# Patient Record
Sex: MALE | Race: WHITE | Employment: FULL TIME | URBAN - METROPOLITAN AREA
[De-identification: names, ages, dates, MRNs, and addresses within clinical notes are randomized per-mention and may not be internally consistent; named-entity substitution may affect disease eponyms.]

---

## 2017-01-26 DIAGNOSIS — Z12.11 SPECIAL SCREENING FOR MALIGNANT NEOPLASMS, COLON: ICD-10-CM

## 2017-01-26 LAB
HEMOCCULT STL QL: NORMAL

## 2017-01-26 PROCEDURE — 82270 OCCULT BLOOD FECES: CPT | Performed by: INTERNAL MEDICINE

## 2017-05-22 RX ORDER — LISINOPRIL 20 MG/1
TABLET ORAL
Qty: 180 TABLET | Refills: 3 | Status: SHIPPED | OUTPATIENT
Start: 2017-05-22

## 2017-06-22 RX ORDER — ATORVASTATIN CALCIUM 40 MG/1
TABLET, FILM COATED ORAL
Qty: 90 TABLET | Refills: 0 | Status: SHIPPED | OUTPATIENT
Start: 2017-06-22 | End: 2017-09-18 | Stop reason: SDUPTHER

## 2017-06-26 ENCOUNTER — OFFICE VISIT (OUTPATIENT)
Dept: CARDIOLOGY CLINIC | Age: 72
End: 2017-06-26

## 2017-06-26 VITALS
HEART RATE: 60 BPM | BODY MASS INDEX: 30.21 KG/M2 | DIASTOLIC BLOOD PRESSURE: 84 MMHG | SYSTOLIC BLOOD PRESSURE: 136 MMHG | WEIGHT: 229 LBS

## 2017-06-26 DIAGNOSIS — I25.10 ASHD (ARTERIOSCLEROTIC HEART DISEASE): Primary | ICD-10-CM

## 2017-06-26 DIAGNOSIS — E78.00 PURE HYPERCHOLESTEROLEMIA: ICD-10-CM

## 2017-06-26 DIAGNOSIS — I10 ESSENTIAL HYPERTENSION: ICD-10-CM

## 2017-06-26 PROCEDURE — 99213 OFFICE O/P EST LOW 20 MIN: CPT | Performed by: INTERNAL MEDICINE

## 2017-06-26 PROCEDURE — G8598 ASA/ANTIPLAT THER USED: HCPCS | Performed by: INTERNAL MEDICINE

## 2017-06-26 PROCEDURE — 4040F PNEUMOC VAC/ADMIN/RCVD: CPT | Performed by: INTERNAL MEDICINE

## 2017-06-26 PROCEDURE — G8417 CALC BMI ABV UP PARAM F/U: HCPCS | Performed by: INTERNAL MEDICINE

## 2017-06-26 PROCEDURE — 3017F COLORECTAL CA SCREEN DOC REV: CPT | Performed by: INTERNAL MEDICINE

## 2017-06-26 PROCEDURE — 1036F TOBACCO NON-USER: CPT | Performed by: INTERNAL MEDICINE

## 2017-06-26 PROCEDURE — G8427 DOCREV CUR MEDS BY ELIG CLIN: HCPCS | Performed by: INTERNAL MEDICINE

## 2017-06-26 PROCEDURE — 1123F ACP DISCUSS/DSCN MKR DOCD: CPT | Performed by: INTERNAL MEDICINE

## 2017-06-26 RX ORDER — ST. JOHN'S WORT 300 MG
CAPSULE ORAL
Status: ON HOLD | COMMUNITY
End: 2018-06-29 | Stop reason: CLARIF

## 2017-06-28 ENCOUNTER — TELEPHONE (OUTPATIENT)
Dept: INTERNAL MEDICINE | Age: 72
End: 2017-06-28

## 2017-09-01 RX ORDER — METOPROLOL TARTRATE 50 MG/1
TABLET, FILM COATED ORAL
Qty: 180 TABLET | Refills: 0 | Status: SHIPPED | OUTPATIENT
Start: 2017-09-01 | End: 2017-11-25 | Stop reason: SDUPTHER

## 2017-09-19 RX ORDER — ATORVASTATIN CALCIUM 40 MG/1
TABLET, FILM COATED ORAL
Qty: 90 TABLET | Refills: 2 | Status: SHIPPED | OUTPATIENT
Start: 2017-09-19

## 2017-11-27 NOTE — TELEPHONE ENCOUNTER
Requested Prescriptions     Pending Prescriptions Disp Refills    metoprolol tartrate (LOPRESSOR) 50 MG tablet [Pharmacy Med Name: METOPROLOL TARTRATE 50MG TABLETS] 180 tablet 3     Sig: TAKE 1 TABLET BY MOUTH TWICE DAILY     Last Fill         Last seen        Next appointment      9/1/17 6/26/17 1/16/18      Last labs     Ordering Physician       6/6/17  Patrick Araujo

## 2017-11-28 RX ORDER — METOPROLOL TARTRATE 50 MG/1
TABLET, FILM COATED ORAL
Qty: 180 TABLET | Refills: 3 | Status: ON HOLD | OUTPATIENT
Start: 2017-11-28 | End: 2020-09-24

## 2017-12-06 LAB — PROSTATE SPECIFIC ANTIGEN: 8.51 NG/ML

## 2018-01-16 ENCOUNTER — TELEPHONE (OUTPATIENT)
Dept: INTERNAL MEDICINE | Age: 73
End: 2018-01-16

## 2018-01-16 DIAGNOSIS — I10 ESSENTIAL HYPERTENSION: ICD-10-CM

## 2018-01-16 DIAGNOSIS — E83.52 HYPERCALCEMIA: ICD-10-CM

## 2018-01-16 DIAGNOSIS — E78.00 PURE HYPERCHOLESTEROLEMIA: Primary | ICD-10-CM

## 2018-01-16 DIAGNOSIS — I25.10 ASHD (ARTERIOSCLEROTIC HEART DISEASE): ICD-10-CM

## 2018-01-16 DIAGNOSIS — E21.0 PRIMARY HYPERPARATHYROIDISM (HCC): ICD-10-CM

## 2018-01-27 LAB
ALBUMIN SERPL-MCNC: 4.3 G/DL
ALP BLD-CCNC: 70 U/L
ALT SERPL-CCNC: 23 U/L
ANION GAP SERPL CALCULATED.3IONS-SCNC: NORMAL MMOL/L
AST SERPL-CCNC: 23 U/L
BASOPHILS ABSOLUTE: 0 /ΜL
BASOPHILS RELATIVE PERCENT: 0 %
BILIRUB SERPL-MCNC: 0.4 MG/DL (ref 0.1–1.4)
BUN BLDV-MCNC: 17 MG/DL
CALCIUM SERPL-MCNC: 9.5 MG/DL
CHLORIDE BLD-SCNC: 102 MMOL/L
CHOLESTEROL, TOTAL: 196 MG/DL
CHOLESTEROL/HDL RATIO: NORMAL
CO2: 24 MMOL/L
CREAT SERPL-MCNC: 0.87 MG/DL
EOSINOPHILS ABSOLUTE: 0.4 /ΜL
EOSINOPHILS RELATIVE PERCENT: 6 %
GFR CALCULATED: NORMAL
GLUCOSE BLD-MCNC: 108 MG/DL
HCT VFR BLD CALC: 42.5 % (ref 41–53)
HDLC SERPL-MCNC: 70 MG/DL (ref 35–70)
HEMOGLOBIN: 14.1 G/DL (ref 13.5–17.5)
LDL CHOLESTEROL CALCULATED: 111 MG/DL (ref 0–160)
LYMPHOCYTES ABSOLUTE: 1.5 /ΜL
LYMPHOCYTES RELATIVE PERCENT: 23 %
MCH RBC QN AUTO: 31.6 PG
MCHC RBC AUTO-ENTMCNC: 33.2 G/DL
MCV RBC AUTO: 95 FL
MONOCYTES ABSOLUTE: 0.7 /ΜL
MONOCYTES RELATIVE PERCENT: 11 %
NEUTROPHILS ABSOLUTE: 4.1 /ΜL
NEUTROPHILS RELATIVE PERCENT: 60 %
PDW BLD-RTO: 12.4 %
PLATELET # BLD: 258 K/ΜL
PMV BLD AUTO: NORMAL FL
POTASSIUM SERPL-SCNC: 4.6 MMOL/L
RBC # BLD: 4.46 10^6/ΜL
SODIUM BLD-SCNC: 142 MMOL/L
TOTAL CK: 223 U/L
TOTAL PROTEIN: 7.1
TRIGL SERPL-MCNC: 74 MG/DL
TSH SERPL DL<=0.05 MIU/L-ACNC: 1.24 UIU/ML
VLDLC SERPL CALC-MCNC: 15 MG/DL
WBC # BLD: 6.8 10^3/ML

## 2018-01-29 DIAGNOSIS — I25.10 ASHD (ARTERIOSCLEROTIC HEART DISEASE): ICD-10-CM

## 2018-01-29 DIAGNOSIS — E83.52 HYPERCALCEMIA: ICD-10-CM

## 2018-01-29 DIAGNOSIS — E78.00 PURE HYPERCHOLESTEROLEMIA: ICD-10-CM

## 2018-01-29 DIAGNOSIS — I10 ESSENTIAL HYPERTENSION: ICD-10-CM

## 2018-04-19 ENCOUNTER — OFFICE VISIT (OUTPATIENT)
Dept: INTERNAL MEDICINE | Age: 73
End: 2018-04-19

## 2018-04-19 VITALS
WEIGHT: 223 LBS | HEIGHT: 72 IN | HEART RATE: 58 BPM | BODY MASS INDEX: 30.2 KG/M2 | SYSTOLIC BLOOD PRESSURE: 120 MMHG | DIASTOLIC BLOOD PRESSURE: 70 MMHG | RESPIRATION RATE: 12 BRPM

## 2018-04-19 DIAGNOSIS — Z13.6 SCREENING FOR AAA (ABDOMINAL AORTIC ANEURYSM): ICD-10-CM

## 2018-04-19 DIAGNOSIS — I25.10 ASHD (ARTERIOSCLEROTIC HEART DISEASE): ICD-10-CM

## 2018-04-19 DIAGNOSIS — Z12.11 SPECIAL SCREENING FOR MALIGNANT NEOPLASMS, COLON: ICD-10-CM

## 2018-04-19 DIAGNOSIS — E78.00 PURE HYPERCHOLESTEROLEMIA: ICD-10-CM

## 2018-04-19 DIAGNOSIS — Z00.00 MEDICARE ANNUAL WELLNESS VISIT, SUBSEQUENT: Primary | ICD-10-CM

## 2018-04-19 DIAGNOSIS — I10 ESSENTIAL HYPERTENSION: ICD-10-CM

## 2018-04-19 LAB
BILIRUBIN, POC: NORMAL
BLOOD URINE, POC: NORMAL
CLARITY, POC: CLEAR
COLOR, POC: YELLOW
GLUCOSE URINE, POC: NORMAL
KETONES, POC: NORMAL
LEUKOCYTE EST, POC: NORMAL
NITRITE, POC: NORMAL
PH, POC: 5
PROTEIN, POC: NORMAL
SPECIFIC GRAVITY, POC: 1.01
UROBILINOGEN, POC: NORMAL

## 2018-04-19 PROCEDURE — 93000 ELECTROCARDIOGRAM COMPLETE: CPT | Performed by: INTERNAL MEDICINE

## 2018-04-19 PROCEDURE — 81002 URINALYSIS NONAUTO W/O SCOPE: CPT | Performed by: INTERNAL MEDICINE

## 2018-04-19 PROCEDURE — G0444 DEPRESSION SCREEN ANNUAL: HCPCS | Performed by: INTERNAL MEDICINE

## 2018-04-19 PROCEDURE — G0439 PPPS, SUBSEQ VISIT: HCPCS | Performed by: INTERNAL MEDICINE

## 2018-04-19 PROCEDURE — 4040F PNEUMOC VAC/ADMIN/RCVD: CPT | Performed by: INTERNAL MEDICINE

## 2018-04-19 PROCEDURE — G8598 ASA/ANTIPLAT THER USED: HCPCS | Performed by: INTERNAL MEDICINE

## 2018-04-19 RX ORDER — TAMSULOSIN HYDROCHLORIDE 0.4 MG/1
0.4 CAPSULE ORAL 2 TIMES DAILY
Status: ON HOLD | COMMUNITY
End: 2018-07-01 | Stop reason: HOSPADM

## 2018-04-19 ASSESSMENT — LIFESTYLE VARIABLES
HOW OFTEN DURING THE LAST YEAR HAVE YOU FOUND THAT YOU WERE NOT ABLE TO STOP DRINKING ONCE YOU HAD STARTED: 0
HOW OFTEN DURING THE LAST YEAR HAVE YOU NEEDED AN ALCOHOLIC DRINK FIRST THING IN THE MORNING TO GET YOURSELF GOING AFTER A NIGHT OF HEAVY DRINKING: 0
HOW OFTEN DO YOU HAVE A DRINK CONTAINING ALCOHOL: 4
HOW OFTEN DO YOU HAVE SIX OR MORE DRINKS ON ONE OCCASION: 1
HAVE YOU OR SOMEONE ELSE BEEN INJURED AS A RESULT OF YOUR DRINKING: 0
HOW OFTEN DURING THE LAST YEAR HAVE YOU HAD A FEELING OF GUILT OR REMORSE AFTER DRINKING: 0
AUDIT-C TOTAL SCORE: 5
HOW MANY STANDARD DRINKS CONTAINING ALCOHOL DO YOU HAVE ON A TYPICAL DAY: 0
HAS A RELATIVE, FRIEND, DOCTOR, OR ANOTHER HEALTH PROFESSIONAL EXPRESSED CONCERN ABOUT YOUR DRINKING OR SUGGESTED YOU CUT DOWN: 0
HOW OFTEN DURING THE LAST YEAR HAVE YOU FAILED TO DO WHAT WAS NORMALLY EXPECTED FROM YOU BECAUSE OF DRINKING: 0
AUDIT TOTAL SCORE: 5
HOW OFTEN DURING THE LAST YEAR HAVE YOU BEEN UNABLE TO REMEMBER WHAT HAPPENED THE NIGHT BEFORE BECAUSE YOU HAD BEEN DRINKING: 0

## 2018-04-19 ASSESSMENT — ANXIETY QUESTIONNAIRES: GAD7 TOTAL SCORE: 2

## 2018-04-25 ENCOUNTER — HOSPITAL ENCOUNTER (OUTPATIENT)
Dept: ULTRASOUND IMAGING | Age: 73
Discharge: HOME | End: 2018-04-25
Attending: INTERNAL MEDICINE | Admitting: INTERNAL MEDICINE

## 2018-04-25 DIAGNOSIS — Z13.6 ENCOUNTER FOR SCREENING FOR CARDIOVASCULAR DISORDERS: ICD-10-CM

## 2018-04-25 DIAGNOSIS — Z13.6 SCREENING FOR AAA (ABDOMINAL AORTIC ANEURYSM): ICD-10-CM

## 2018-05-09 DIAGNOSIS — Z12.11 SPECIAL SCREENING FOR MALIGNANT NEOPLASMS, COLON: ICD-10-CM

## 2018-05-09 LAB
CONTROL: NORMAL
HEMOCCULT STL QL: NORMAL

## 2018-05-09 PROCEDURE — G0328 FECAL BLOOD SCRN IMMUNOASSAY: HCPCS | Performed by: INTERNAL MEDICINE

## 2018-06-04 LAB — PROSTATE SPECIFIC ANTIGEN: 8.24 NG/ML

## 2018-06-25 ENCOUNTER — OFFICE VISIT (OUTPATIENT)
Dept: INTERNAL MEDICINE | Age: 73
End: 2018-06-25

## 2018-06-25 VITALS
RESPIRATION RATE: 12 BRPM | WEIGHT: 222 LBS | SYSTOLIC BLOOD PRESSURE: 130 MMHG | DIASTOLIC BLOOD PRESSURE: 76 MMHG | BODY MASS INDEX: 30.07 KG/M2 | HEART RATE: 50 BPM | HEIGHT: 72 IN

## 2018-06-25 DIAGNOSIS — Z01.818 PRE-OP EXAM: ICD-10-CM

## 2018-06-25 DIAGNOSIS — N40.1 BENIGN PROSTATIC HYPERPLASIA WITH URINARY RETENTION: ICD-10-CM

## 2018-06-25 DIAGNOSIS — R33.8 BENIGN PROSTATIC HYPERPLASIA WITH URINARY RETENTION: ICD-10-CM

## 2018-06-25 DIAGNOSIS — Z01.818 PRE-OP EXAM: Primary | ICD-10-CM

## 2018-06-25 LAB
ANION GAP SERPL CALCULATED.3IONS-SCNC: 11 MMOL/L (ref 3–16)
BASOPHILS ABSOLUTE: 0 K/UL (ref 0–0.2)
BASOPHILS RELATIVE PERCENT: 0.5 %
BUN BLDV-MCNC: 21 MG/DL (ref 7–20)
CALCIUM SERPL-MCNC: 9.5 MG/DL (ref 8.3–10.6)
CHLORIDE BLD-SCNC: 103 MMOL/L (ref 99–110)
CO2: 29 MMOL/L (ref 21–32)
CREAT SERPL-MCNC: 0.7 MG/DL (ref 0.8–1.3)
EOSINOPHILS ABSOLUTE: 0.3 K/UL (ref 0–0.6)
EOSINOPHILS RELATIVE PERCENT: 3.7 %
GFR AFRICAN AMERICAN: >60
GFR NON-AFRICAN AMERICAN: >60
GLUCOSE BLD-MCNC: 101 MG/DL (ref 70–99)
HCT VFR BLD CALC: 40.8 % (ref 40.5–52.5)
HEMOGLOBIN: 14.1 G/DL (ref 13.5–17.5)
LYMPHOCYTES ABSOLUTE: 1.8 K/UL (ref 1–5.1)
LYMPHOCYTES RELATIVE PERCENT: 21.1 %
MCH RBC QN AUTO: 33.1 PG (ref 26–34)
MCHC RBC AUTO-ENTMCNC: 34.5 G/DL (ref 31–36)
MCV RBC AUTO: 96 FL (ref 80–100)
MONOCYTES ABSOLUTE: 0.7 K/UL (ref 0–1.3)
MONOCYTES RELATIVE PERCENT: 8.4 %
NEUTROPHILS ABSOLUTE: 5.6 K/UL (ref 1.7–7.7)
NEUTROPHILS RELATIVE PERCENT: 66.3 %
PDW BLD-RTO: 13.3 % (ref 12.4–15.4)
PLATELET # BLD: 254 K/UL (ref 135–450)
PMV BLD AUTO: 8.4 FL (ref 5–10.5)
POTASSIUM SERPL-SCNC: 4.6 MMOL/L (ref 3.5–5.1)
RBC # BLD: 4.25 M/UL (ref 4.2–5.9)
SODIUM BLD-SCNC: 143 MMOL/L (ref 136–145)
WBC # BLD: 8.4 K/UL (ref 4–11)

## 2018-06-25 PROCEDURE — G8417 CALC BMI ABV UP PARAM F/U: HCPCS | Performed by: INTERNAL MEDICINE

## 2018-06-25 PROCEDURE — 93000 ELECTROCARDIOGRAM COMPLETE: CPT | Performed by: INTERNAL MEDICINE

## 2018-06-25 PROCEDURE — 99213 OFFICE O/P EST LOW 20 MIN: CPT | Performed by: INTERNAL MEDICINE

## 2018-06-25 PROCEDURE — G8427 DOCREV CUR MEDS BY ELIG CLIN: HCPCS | Performed by: INTERNAL MEDICINE

## 2018-06-25 PROCEDURE — 3017F COLORECTAL CA SCREEN DOC REV: CPT | Performed by: INTERNAL MEDICINE

## 2018-06-29 PROBLEM — N13.8 BPH WITH OBSTRUCTION/LOWER URINARY TRACT SYMPTOMS: Status: ACTIVE | Noted: 2018-06-29

## 2018-06-29 PROBLEM — N40.1 BPH WITH OBSTRUCTION/LOWER URINARY TRACT SYMPTOMS: Status: ACTIVE | Noted: 2018-06-29

## 2018-06-30 PROBLEM — Z90.79 S/P PROSTATECTOMY: Status: ACTIVE | Noted: 2018-06-30

## 2018-11-21 LAB — PROSTATE SPECIFIC ANTIGEN: 5.75 NG/ML

## 2019-09-13 ENCOUNTER — TELEPHONE (OUTPATIENT)
Dept: INTERNAL MEDICINE CLINIC | Age: 74
End: 2019-09-13

## 2019-09-24 ENCOUNTER — TELEPHONE (OUTPATIENT)
Dept: INTERNAL MEDICINE CLINIC | Age: 74
End: 2019-09-24

## 2019-09-25 ENCOUNTER — OFFICE VISIT (OUTPATIENT)
Dept: ENT CLINIC | Age: 74
End: 2019-09-25
Payer: MEDICARE

## 2019-09-25 VITALS
WEIGHT: 229.6 LBS | TEMPERATURE: 98.3 F | SYSTOLIC BLOOD PRESSURE: 139 MMHG | BODY MASS INDEX: 30.43 KG/M2 | HEIGHT: 73 IN | DIASTOLIC BLOOD PRESSURE: 67 MMHG | HEART RATE: 67 BPM

## 2019-09-25 DIAGNOSIS — R42 LIGHT HEADED: Primary | ICD-10-CM

## 2019-09-25 PROCEDURE — 99203 OFFICE O/P NEW LOW 30 MIN: CPT | Performed by: OTOLARYNGOLOGY

## 2019-09-25 PROCEDURE — G8417 CALC BMI ABV UP PARAM F/U: HCPCS | Performed by: OTOLARYNGOLOGY

## 2019-09-25 PROCEDURE — G8427 DOCREV CUR MEDS BY ELIG CLIN: HCPCS | Performed by: OTOLARYNGOLOGY

## 2019-09-25 PROCEDURE — 4040F PNEUMOC VAC/ADMIN/RCVD: CPT | Performed by: OTOLARYNGOLOGY

## 2019-09-25 PROCEDURE — 1123F ACP DISCUSS/DSCN MKR DOCD: CPT | Performed by: OTOLARYNGOLOGY

## 2019-09-25 PROCEDURE — 1036F TOBACCO NON-USER: CPT | Performed by: OTOLARYNGOLOGY

## 2019-09-25 PROCEDURE — 3017F COLORECTAL CA SCREEN DOC REV: CPT | Performed by: OTOLARYNGOLOGY

## 2019-09-25 PROCEDURE — G8598 ASA/ANTIPLAT THER USED: HCPCS | Performed by: OTOLARYNGOLOGY

## 2019-09-27 ENCOUNTER — TELEPHONE (OUTPATIENT)
Dept: INTERNAL MEDICINE CLINIC | Age: 74
End: 2019-09-27

## 2019-10-10 PROBLEM — Z90.79 S/P PROSTATECTOMY: Status: RESOLVED | Noted: 2018-06-30 | Resolved: 2019-10-10

## 2019-10-10 PROBLEM — N13.8 BPH WITH OBSTRUCTION/LOWER URINARY TRACT SYMPTOMS: Status: RESOLVED | Noted: 2018-06-29 | Resolved: 2019-10-10

## 2019-10-10 PROBLEM — N40.1 BPH WITH OBSTRUCTION/LOWER URINARY TRACT SYMPTOMS: Status: RESOLVED | Noted: 2018-06-29 | Resolved: 2019-10-10

## 2019-10-15 ENCOUNTER — OFFICE VISIT (OUTPATIENT)
Dept: INTERNAL MEDICINE CLINIC | Age: 74
End: 2019-10-15
Payer: MEDICARE

## 2019-10-15 VITALS
HEART RATE: 60 BPM | SYSTOLIC BLOOD PRESSURE: 124 MMHG | DIASTOLIC BLOOD PRESSURE: 80 MMHG | BODY MASS INDEX: 29.82 KG/M2 | HEIGHT: 73 IN | WEIGHT: 225 LBS | RESPIRATION RATE: 12 BRPM

## 2019-10-15 DIAGNOSIS — Z13.29 SCREENING FOR THYROID DISORDER: ICD-10-CM

## 2019-10-15 DIAGNOSIS — I10 ESSENTIAL HYPERTENSION: ICD-10-CM

## 2019-10-15 DIAGNOSIS — R42 DIZZY: ICD-10-CM

## 2019-10-15 DIAGNOSIS — Z00.00 MEDICARE ANNUAL WELLNESS VISIT, SUBSEQUENT: Primary | ICD-10-CM

## 2019-10-15 DIAGNOSIS — I25.10 ASHD (ARTERIOSCLEROTIC HEART DISEASE): ICD-10-CM

## 2019-10-15 DIAGNOSIS — E78.5 HYPERLIPIDEMIA, UNSPECIFIED HYPERLIPIDEMIA TYPE: ICD-10-CM

## 2019-10-15 DIAGNOSIS — Z12.11 SPECIAL SCREENING FOR MALIGNANT NEOPLASMS, COLON: ICD-10-CM

## 2019-10-15 DIAGNOSIS — Z12.5 SPECIAL SCREENING FOR MALIGNANT NEOPLASM OF PROSTATE: ICD-10-CM

## 2019-10-15 LAB
BILIRUBIN, POC: NORMAL
BLOOD URINE, POC: NORMAL
CLARITY, POC: CLEAR
COLOR, POC: YELLOW
GLUCOSE URINE, POC: NORMAL
KETONES, POC: NORMAL
LEUKOCYTE EST, POC: NORMAL
NITRITE, POC: NORMAL
PH, POC: 6
PROTEIN, POC: NORMAL
SPECIFIC GRAVITY, POC: 1
UROBILINOGEN, POC: NORMAL

## 2019-10-15 PROCEDURE — 3017F COLORECTAL CA SCREEN DOC REV: CPT | Performed by: INTERNAL MEDICINE

## 2019-10-15 PROCEDURE — 93000 ELECTROCARDIOGRAM COMPLETE: CPT | Performed by: INTERNAL MEDICINE

## 2019-10-15 PROCEDURE — G0444 DEPRESSION SCREEN ANNUAL: HCPCS | Performed by: INTERNAL MEDICINE

## 2019-10-15 PROCEDURE — 4040F PNEUMOC VAC/ADMIN/RCVD: CPT | Performed by: INTERNAL MEDICINE

## 2019-10-15 PROCEDURE — 1123F ACP DISCUSS/DSCN MKR DOCD: CPT | Performed by: INTERNAL MEDICINE

## 2019-10-15 PROCEDURE — 81002 URINALYSIS NONAUTO W/O SCOPE: CPT | Performed by: INTERNAL MEDICINE

## 2019-10-15 PROCEDURE — G8598 ASA/ANTIPLAT THER USED: HCPCS | Performed by: INTERNAL MEDICINE

## 2019-10-15 PROCEDURE — G0439 PPPS, SUBSEQ VISIT: HCPCS | Performed by: INTERNAL MEDICINE

## 2019-10-15 PROCEDURE — G8484 FLU IMMUNIZE NO ADMIN: HCPCS | Performed by: INTERNAL MEDICINE

## 2019-10-15 RX ORDER — DEXTROAMPHETAMINE SACCHARATE, AMPHETAMINE ASPARTATE MONOHYDRATE, DEXTROAMPHETAMINE SULFATE AND AMPHETAMINE SULFATE 2.5; 2.5; 2.5; 2.5 MG/1; MG/1; MG/1; MG/1
10 CAPSULE, EXTENDED RELEASE ORAL 2 TIMES DAILY
COMMUNITY

## 2019-10-15 ASSESSMENT — LIFESTYLE VARIABLES
AUDIT-C TOTAL SCORE: 5
HOW OFTEN DURING THE LAST YEAR HAVE YOU NEEDED AN ALCOHOLIC DRINK FIRST THING IN THE MORNING TO GET YOURSELF GOING AFTER A NIGHT OF HEAVY DRINKING: 0
HOW OFTEN DO YOU HAVE SIX OR MORE DRINKS ON ONE OCCASION: 1
HOW OFTEN DURING THE LAST YEAR HAVE YOU FOUND THAT YOU WERE NOT ABLE TO STOP DRINKING ONCE YOU HAD STARTED: 0
HOW OFTEN DURING THE LAST YEAR HAVE YOU BEEN UNABLE TO REMEMBER WHAT HAPPENED THE NIGHT BEFORE BECAUSE YOU HAD BEEN DRINKING: 0
HAS A RELATIVE, FRIEND, DOCTOR, OR ANOTHER HEALTH PROFESSIONAL EXPRESSED CONCERN ABOUT YOUR DRINKING OR SUGGESTED YOU CUT DOWN: 0
HOW OFTEN DO YOU HAVE A DRINK CONTAINING ALCOHOL: 4
HAVE YOU OR SOMEONE ELSE BEEN INJURED AS A RESULT OF YOUR DRINKING: 0
HOW OFTEN DURING THE LAST YEAR HAVE YOU HAD A FEELING OF GUILT OR REMORSE AFTER DRINKING: 0
HOW OFTEN DURING THE LAST YEAR HAVE YOU FAILED TO DO WHAT WAS NORMALLY EXPECTED FROM YOU BECAUSE OF DRINKING: 0
HOW MANY STANDARD DRINKS CONTAINING ALCOHOL DO YOU HAVE ON A TYPICAL DAY: 0
AUDIT TOTAL SCORE: 5

## 2019-10-15 ASSESSMENT — PATIENT HEALTH QUESTIONNAIRE - PHQ9: SUM OF ALL RESPONSES TO PHQ QUESTIONS 1-9: 4

## 2019-10-23 ENCOUNTER — HOSPITAL ENCOUNTER (OUTPATIENT)
Dept: CT IMAGING | Age: 74
Discharge: HOME | End: 2019-10-23
Payer: MEDICARE

## 2019-10-23 DIAGNOSIS — R42 DIZZY: ICD-10-CM

## 2019-10-23 PROCEDURE — 70450 CT HEAD/BRAIN W/O DYE: CPT

## 2019-11-19 DIAGNOSIS — Z12.11 SPECIAL SCREENING FOR MALIGNANT NEOPLASMS, COLON: ICD-10-CM

## 2019-11-19 LAB
CONTROL: NORMAL
HEMOCCULT STL QL: NORMAL

## 2019-11-19 PROCEDURE — 82274 ASSAY TEST FOR BLOOD FECAL: CPT | Performed by: INTERNAL MEDICINE

## 2019-11-25 ENCOUNTER — TELEPHONE (OUTPATIENT)
Dept: INTERNAL MEDICINE CLINIC | Age: 74
End: 2019-11-25

## 2019-12-05 ENCOUNTER — TELEPHONE (OUTPATIENT)
Dept: INTERNAL MEDICINE CLINIC | Age: 74
End: 2019-12-05

## 2019-12-05 DIAGNOSIS — Z13.29 SCREENING FOR THYROID DISORDER: Primary | ICD-10-CM

## 2019-12-05 DIAGNOSIS — E78.5 HYPERLIPIDEMIA, UNSPECIFIED HYPERLIPIDEMIA TYPE: ICD-10-CM

## 2019-12-05 DIAGNOSIS — Z13.29 SCREENING FOR THYROID DISORDER: ICD-10-CM

## 2019-12-05 DIAGNOSIS — I10 ESSENTIAL HYPERTENSION: ICD-10-CM

## 2019-12-05 DIAGNOSIS — Z12.5 SPECIAL SCREENING FOR MALIGNANT NEOPLASM OF PROSTATE: ICD-10-CM

## 2019-12-05 LAB
ALBUMIN SERPL-MCNC: 4.1 G/DL
ALP BLD-CCNC: 92 U/L
ALT SERPL-CCNC: 26 U/L
ANION GAP SERPL CALCULATED.3IONS-SCNC: NORMAL MMOL/L
AST SERPL-CCNC: 24 U/L
BASOPHILS ABSOLUTE: 0 /ΜL
BASOPHILS RELATIVE PERCENT: 0 %
BILIRUB SERPL-MCNC: 0.4 MG/DL (ref 0.1–1.4)
BUN BLDV-MCNC: 16 MG/DL
CALCIUM SERPL-MCNC: 9.5 MG/DL
CHLORIDE BLD-SCNC: 101 MMOL/L
CHOLESTEROL, TOTAL: 160 MG/DL
CHOLESTEROL/HDL RATIO: NORMAL
CO2: 25 MMOL/L
CREAT SERPL-MCNC: 0.73 MG/DL
EOSINOPHILS ABSOLUTE: 0.5 /ΜL
EOSINOPHILS RELATIVE PERCENT: 4 %
GFR CALCULATED: NORMAL
GLUCOSE BLD-MCNC: 98 MG/DL
HCT VFR BLD CALC: 42.8 % (ref 41–53)
HDLC SERPL-MCNC: 56 MG/DL (ref 35–70)
HEMOGLOBIN: 14.2 G/DL (ref 13.5–17.5)
LDL CHOLESTEROL CALCULATED: 92 MG/DL (ref 0–160)
LYMPHOCYTES ABSOLUTE: 1.8 /ΜL
LYMPHOCYTES RELATIVE PERCENT: 18 %
MCH RBC QN AUTO: 31.5 PG
MCHC RBC AUTO-ENTMCNC: 33.2 G/DL
MCV RBC AUTO: 95 FL
MONOCYTES ABSOLUTE: 0.9 /ΜL
MONOCYTES RELATIVE PERCENT: 9 %
NEUTROPHILS ABSOLUTE: 7 /ΜL
NEUTROPHILS RELATIVE PERCENT: 69 %
PDW BLD-RTO: 12.7 %
PLATELET # BLD: 372 K/ΜL
PMV BLD AUTO: NORMAL FL
POTASSIUM SERPL-SCNC: 4.6 MMOL/L
PROSTATE SPECIFIC ANTIGEN: 9.3 NG/ML
RBC # BLD: 4.51 10^6/ΜL
SODIUM BLD-SCNC: 140 MMOL/L
TOTAL CK: 95 U/L
TOTAL PROTEIN: 6.8
TRIGL SERPL-MCNC: 59 MG/DL
VLDLC SERPL CALC-MCNC: 12 MG/DL
WBC # BLD: 10.1 10^3/ML

## 2019-12-06 LAB — TSH SERPL DL<=0.05 MIU/L-ACNC: 0.94 UIU/ML

## 2019-12-09 DIAGNOSIS — Z13.29 SCREENING FOR THYROID DISORDER: ICD-10-CM

## 2019-12-11 ENCOUNTER — TELEPHONE (OUTPATIENT)
Dept: INTERNAL MEDICINE CLINIC | Age: 74
End: 2019-12-11

## 2019-12-12 ENCOUNTER — TELEPHONE (OUTPATIENT)
Dept: INTERNAL MEDICINE CLINIC | Age: 74
End: 2019-12-12

## 2019-12-12 DIAGNOSIS — M25.50 POLYARTHRALGIA: Primary | ICD-10-CM

## 2020-01-08 ENCOUNTER — TELEPHONE (OUTPATIENT)
Dept: INTERNAL MEDICINE CLINIC | Age: 75
End: 2020-01-08

## 2020-01-08 RX ORDER — MELOXICAM 15 MG/1
TABLET ORAL
Qty: 90 TABLET | Refills: 0 | Status: ON HOLD | OUTPATIENT
Start: 2020-01-08 | End: 2020-09-24

## 2020-01-08 NOTE — TELEPHONE ENCOUNTER
He made appt with the rheumatologist - his appt is 3 weeks away   Fingers hurt and tingle (like perirenal neuropathy)   Shoulders are painful   Hard to sleep   Should he keep tis appt   Is there anything you can do for him in the meantime?

## 2020-01-24 LAB
ALBUMIN SERPL-MCNC: 4.2 G/DL
ALP BLD-CCNC: 87 U/L
ALT SERPL-CCNC: 27 U/L
ANION GAP SERPL CALCULATED.3IONS-SCNC: NORMAL MMOL/L
AST SERPL-CCNC: 24 U/L
BASOPHILS ABSOLUTE: 0 /ΜL
BASOPHILS RELATIVE PERCENT: 0 %
BILIRUB SERPL-MCNC: 0.2 MG/DL (ref 0.1–1.4)
BUN BLDV-MCNC: 24 MG/DL
C-REACTIVE PROTEIN: 16
CALCIUM SERPL-MCNC: 9.6 MG/DL
CHLORIDE BLD-SCNC: 104 MMOL/L
CO2: 24 MMOL/L
CREAT SERPL-MCNC: 0.69 MG/DL
EOSINOPHILS ABSOLUTE: 0.4 /ΜL
EOSINOPHILS RELATIVE PERCENT: 4 %
GFR CALCULATED: NORMAL
GLUCOSE BLD-MCNC: 100 MG/DL
HCT VFR BLD CALC: 40.1 % (ref 41–53)
HEMOGLOBIN: 13.2 G/DL (ref 13.5–17.5)
LYMPHOCYTES ABSOLUTE: 2.5 /ΜL
LYMPHOCYTES RELATIVE PERCENT: 27 %
MCH RBC QN AUTO: 30.3 PG
MCHC RBC AUTO-ENTMCNC: 32.9 G/DL
MCV RBC AUTO: 92 FL
MONOCYTES ABSOLUTE: 1 /ΜL
MONOCYTES RELATIVE PERCENT: 10 %
NEUTROPHILS ABSOLUTE: 5.4 /ΜL
NEUTROPHILS RELATIVE PERCENT: 58 %
PLATELET # BLD: 420 K/ΜL
PMV BLD AUTO: ABNORMAL FL
POTASSIUM SERPL-SCNC: 4.5 MMOL/L
RBC # BLD: 4.35 10^6/ΜL
SEDIMENTATION RATE, ERYTHROCYTE: 25
SODIUM BLD-SCNC: 141 MMOL/L
TOTAL CK: 76 U/L
TOTAL PROTEIN: 6.9
WBC # BLD: 9.4 10^3/ML

## 2020-08-17 ENCOUNTER — HOSPITAL ENCOUNTER (OUTPATIENT)
Dept: SPEECH THERAPY | Age: 75
Setting detail: THERAPIES SERIES
Discharge: HOME | End: 2020-08-17
Payer: MEDICARE

## 2020-08-17 PROCEDURE — 92526 ORAL FUNCTION THERAPY: CPT

## 2020-08-17 PROCEDURE — 92610 EVALUATE SWALLOWING FUNCTION: CPT

## 2020-08-17 NOTE — PROGRESS NOTES
Speech Language Pathology  Facility/Department: [facility] SPEECH THERAPY   CLINICAL BEDSIDE SWALLOW EVALUATION    NAME: Teresa Santos  : 1945  MRN: [id number]    ADMISSION DATE: 2020    Past Medical History:  has a past medical history of Arthritis, ASHD (arteriosclerotic heart disease), Cancer ([facility] [address] Dizziness, Fracture of nasal bone, Hearing loss, HTN (hypertension), Hyperlipidemia, Hyperparathyroidism ([address] Migraine, Nosebleed, Prostate atrophy, Sleep apnea, and Tinnitus. Past Surgical History:  has a past surgical history that includes Tonsillectomy; Rotator cuff repair (); Prostate biopsy (); Colonoscopy (Aug., 2004 (  )); Coronary artery bypass graft (Oct., 2004); Prostate biopsy (); parathyroidectomy (2014 ); Colonoscopy (Mar., 2015 (  )); TURP (*2018); Cardiac surgery; other surgical history (N/A, 2018); Appendectomy; laryngoscopy (*May 19, 2020); and Gastrostomy tube placement (*2020). Recent Chest Xray/CT of Chest: No current CXR/CT on file  Treatment Dx (ICD 10 code): R13.10 (ICD-10-CM) - Dysphagia   Insurance/Certification Information: Medicare, [address] [place] [address] Submitted: (y/n) Y  Medicare Cap (if applicable): NA    Date of Eval: 2020  Evaluating Therapist: Lise Ocasio    Current Diet level:  Current Diet : NPO  Current Liquid Diet : NPO    Primary Complaint:   Patient Complaint: Having to spit out mucus (increased when talking), pain in mouth and throat    Pain:  Pt reports 3-4/10 level pain throughout the day, at night pt gets up to level 9 \"shooting\" pain. Reason for Referral  Teresa Santos was referred for a bedside swallow evaluation to assess the efficiency of his swallow function, identify signs and symptoms of aspiration, and make recommendations regarding safe dietary consistencies, effective compensatory strategies, and safe eating environment.     Impression  Pt presents with severe oropharyngeal phase dysphagia. 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 Pt s/p PEG placement in 7/22/20. He reported having oral thrush currently, taking medication 3x/day. He reported significant oral and throat pain. Pt wife reported pt has not had anything PO in multiple week. Pt has completed exercises given at home but reported the tongue press causes him to \"gag/vomit\". Pt was able to tolerate 1 cup sip of water, with the 2nd cup sip pt had immediate throat clear, cough, and regurgitated liquid. Reduced hyolaryngeal excursion also noticed upon palpation of the anterior neck. Clinician gave pt exercises to pt to complete, hyper gag reflex was observed with exercises involving the tongue base or movements of the tongue. Education was given to the pt RE: role of SLP, purpose of MBS, rationalization of current diet, exercises to complete at home as tolerated. Recommend MBS to assess the integrity of the pharyngeal structures and to determine safest and LRD diet. Pt was encouraged to remain NPO w/ meds given via PEG. Dysphagia Diagnosis  Dysphagia Diagnosis: Suspected needs further assessment  Dysphagia Outcome Severity Scale: Level 1: Severe dysphagia- NPO.  Unable to tolerate any PO safely    Treatment Plan  Requires SLP Intervention: Yes     Duration/Frequency of Treatment: TBD Pending MBS  D/C Recommendations: Home independently    Recommended Diet and Intervention  Solid: Diet Solids Recommendation: NPO  Liquid: Liquid Consistency Recommendation: NPO  Medication:Recommended Form of Meds: Via alternative means of nutrition  Therapeutic Interventions: Effortful swallow, Bolus control exercises, Oral care, Tongue base strengthening, Patient/Family education, Pharyngeal exercises, Laryngeal exercises   Oral care 3x day as tolerated   Compensatory Swallowing Strategies Attempted  NPO with swallow exercises and oral care 3x a day as tolerated. Treatment/Goals  Short-term Goals  Goal 1: TBD pending MBS  Long-term Goals  Goal 1: TBD pending MBS    General  Chart Reviewed: Yes  Comments: Pt recently seen in the hospital for dysphagia. Thrush  Subjective: Pt was brought by his wife. Pt reports not eating anything for weeks due to oral pain and difficulty swallowing. Pt reorts having completed exercises given by hospital  (Falsetto EE, jaw opening, effortful swallow) , one of the exercises caused vomitting (base of tongue to roof of mouth)  Behavior/Cognition  Behavior/Cognition: Alert; Cooperative;Pleasant mood  Respiratory Status: Room air  O2 Device: None (Room air)  Dentition: Adequate  Patient Positioning: Upright in chair  Baseline Vocal Quality: (mildly hoarse)  Prior Dysphagia History: Pt recently seen at the hospital by  and was given exercises to complete. Consistencies Administered: Thin - cup     Vision/Hearing  Vision  Vision: Impaired  Vision Exceptions: Wears glasses at all times    Oral Motor Deficits  Oral/Motor  Oral Motor: Exceptions to [facility]  Labial Strength: Reduced  Labial Coordination: Reduced(took pt extra time to coordinate lips from blowing cheeks out to pucker)  Lingual ROM: Reduced left; Reduced right(pt reported pain when trying to stick tongue out and moving side to side)  Lingual Strength: Reduced  Lingual Coordination: Reduced    Oral Phase Dysfunction  Oral Phase  Oral Phase - Comment: Not assessed due to pt NPO, and increased gag reflex on saliva during exercises     Indicators of Pharyngeal Phase Dysfunction   Pharyngeal Phase  Pharyngeal Phase: Exceptions  Indicators of Pharyngeal Phase Dysfunction  Throat Clearing - Immediate: Thin;Thin - cup  Cough - Immediate: Thin - cup; Thin  Pharyngeal Phase   Pharyngeal: Not fully assessed due to pt NPO, and increased gag reflex on saliva during exercises, regurgitation of small water sip    Prognosis  Prognosis  Prognosis for safe diet advancement: fair  Barriers to

## 2020-08-17 NOTE — PROGRESS NOTES
Outpatient Speech Therapy  Phone: [phone] Fax: [phone]     To: Beecher Koyanagi., MD       Patient: Elizabeth Friedman  : 1945  MRN: [id number]  Evaluation Date: 2020      Diagnosis Information: R13.10 (ICD-10-CM) - Dysphagia             Speech Therapy Certification Form    Plan of Care/Treatment to date:  [] Speech-Language Evaluation/Treatment    [x] Dysphagia Evaluation/Treatment        [] Dysphagia Treatment via Neuromuscular Electrical Stimulation (NMES)   [x] Modified Barium Swallowing Study   [] Fiberoptic Endoscopic Evaluation of Swallowing (FEES)  [] Cognitive-Linguistic Skills Development  [] Voice evaluation and Treatment      [] Evaluation, modification, and Training of Voice Prosthetic     [] Evaluation for Speech-Generating Augmentative and Alternative Communication Device   [] Therapeutic Services for the use of Speech-Generating Device. [] Other:          Frequency/Duration:  # Days per week: [] 1 day # Weeks: [] 1 week [] 5 weeks      [x] 2 days   [] 2 weeks [x] 6 weeks     [] 3 days   [] 3 weeks [] 7 weeks     [] 4 days   [] 4 weeks [x] 8 weeks    Rehab Potential: [] Excellent [x] Good [] Fair  [] Poor       Electronically signed by:   Paulina Mahmood M.S. [facility]  Speech-language pathologist  [id number]    Phone: [phone]  Fax: [phone]    If you have any questions or concerns, please don't hesitate to call.   Thank you for your referral.      Physician Signature:________________________________Date:__________________  By signing above, therapists plan is approved by physician

## 2020-08-18 NOTE — PROGRESS NOTES
Outpatient Speech Therapy  Phone: [phone] Fax: [phone]     To: Krystal Patel MD       Patient: Ronaldo Osuna  : 1945  MRN: [id number]  Evaluation Date: 2020      Diagnosis Information: R13.10 (ICD-10-CM) - Dysphagia             Speech Therapy Certification Form / Request for MBSS Order  Recommend completion of MBSS to instrumentally assess the pharyngeal phase of the swallow and to determine pt's safest and least restrictive diet. Completion of MBSS is recommended prior to further dysphagia tx. Physician signature on this certification serves as order for MBSS. Plan of Care/Treatment to date:  [] Speech-Language Evaluation/Treatment    [x] Dysphagia Evaluation/Treatment        [] Dysphagia Treatment via Neuromuscular Electrical Stimulation (NMES)   [x] Modified Barium Swallowing Study   [] Fiberoptic Endoscopic Evaluation of Swallowing (FEES)  [] Cognitive-Linguistic Skills Development  [] Voice evaluation and Treatment      [] Evaluation, modification, and Training of Voice Prosthetic     [] Evaluation for Speech-Generating Augmentative and Alternative Communication Device   [] Therapeutic Services for the use of Speech-Generating Device. [] Other:          Frequency/Duration:  # Days per week: [] 1 day # Weeks: [] 1 week [] 5 weeks      [x] 2 days   [] 2 weeks [x] 6 weeks     [] 3 days   [] 3 weeks [] 7 weeks     [] 4 days   [] 4 weeks [x] 8 weeks    Rehab Potential: [] Excellent [x] Good [] Fair  [] Poor       Electronically signed by:   Jin Grimm M.S. [address] [facility]  Speech-language pathologist  [id number]    Phone: [phone]  Fax: [phone]    If you have any questions or concerns, please don't hesitate to call.   Thank you for your referral.      Physician Signature:________________________________Date:__________________  By signing above, therapists plan is approved by physician

## 2020-08-19 ENCOUNTER — APPOINTMENT (OUTPATIENT)
Dept: SPEECH THERAPY | Age: 75
End: 2020-08-19
Payer: MEDICARE

## 2020-08-24 ENCOUNTER — HOSPITAL ENCOUNTER (OUTPATIENT)
Dept: SPEECH THERAPY | Age: 75
Setting detail: THERAPIES SERIES
Discharge: HOME | End: 2020-08-24
Payer: MEDICARE

## 2020-08-25 ENCOUNTER — HOSPITAL ENCOUNTER (OUTPATIENT)
Dept: GENERAL RADIOLOGY | Age: 75
Discharge: HOME | End: 2020-08-25
Payer: MEDICARE

## 2020-08-25 PROCEDURE — 74230 X-RAY XM SWLNG FUNCJ C+: CPT

## 2020-08-26 ENCOUNTER — HOSPITAL ENCOUNTER (OUTPATIENT)
Dept: SPEECH THERAPY | Age: 75
Setting detail: THERAPIES SERIES
Discharge: HOME | End: 2020-08-26
Payer: MEDICARE

## 2020-08-31 ENCOUNTER — APPOINTMENT (OUTPATIENT)
Dept: SPEECH THERAPY | Age: 75
End: 2020-08-31
Payer: MEDICARE

## 2020-09-02 ENCOUNTER — HOSPITAL ENCOUNTER (OUTPATIENT)
Dept: SPEECH THERAPY | Age: 75
Setting detail: THERAPIES SERIES
Discharge: HOME | End: 2020-09-02
Payer: MEDICARE

## 2020-09-02 PROCEDURE — 92526 ORAL FUNCTION THERAPY: CPT

## 2020-09-02 NOTE — FLOWSHEET NOTE
Speech-Language Pathology  Daily Treatment Note    Date:  2020    Patient Name:  César Kothari    :  1945  MRN: [id number]  Restrictions/Precautions:  Oropharyngeal Dysphagia; Minced and Moist Diet with Thin liquids; Still receiving PEG tube bolus feedings  Treatment Diagnosis:  R13.10 (ICD-10-CM) - Dysphagia   Insurance/Certification information:  Medicare, [address] [place] [address]  Referring Physician:  Oksana Quiñonez MD     Plan of care signed (Y/N): Y   Visit# / total visits:  3/17  Pain level: 0/10     Progress Note: []  Yes  [x]  No  Next due by: Visit #10: 3/10 or 20     Subjective: The pt was seen for his first dysphagia treatment follow-up. He cancelled or no-showed for the previous sessions. He reported that he has a good appetite and is eating soft foods and thin liquids including soups, cooked vegetables and purees. He said he still has frequent gagging. He was shown strategies to prevent gagging including encircling his hand around his left thumb and humming before swallowing. There was just x 1 instance of brief gagging during the session which was when drinking water. His previous MBS results were reviewed. Swallowing exercises were given and completed at length with a handout listing these exercises also given. Objective:   Short-Term Goals:  1. The pt will tolerate thin liquids without s/s of aspiration, gagging or other form of dysphagia 10/10:  - Thin water via cup: 4/6 with coughing following one of he larger sips of water and there was another instance of throat clearing post-swallow    2. The pt will tolerate soft solids without s/s of aspiraiton, gagging or other form of dysphagia 10/10:  - Mixed fruit cup (diced peaches, pears, pineapple and cherry):  5/5; No s/s of aspiration    3.  The pt will tolerate regular texture solids without s/s of aspiraiton, gagging or other form of dysphagia 10/10:   - Did not target    Other Treatments:   - The following swallowing exercises were trained and completed:  · Madeline  · Jutting jaw forward for count of 5  · Pressing tongue to roof of mouth for count of 5  · Lowering jaw against resistance  · Shaker head lift  · Falsetto EE  · Mendelsohn Maneuver  · Effortful swallow    Assessment:   Noted improved tolerance of solids without as significant indicators of pharyngeal residue. Intermittent s/s of aspiration with thins in the form of throat clearing and coughing. Plan:   Continued Dysphagia treatment 2 x week for 6-8 weeks. Timed Code Treatment: 0 minutes    Total Treatment Time: 45 minutes (6507-4369)    Signature:     Swapna Sorto MA, [facility]  OB#[id number]  Speech-Language Pathologist  Phone #: [phone]  Fax #: [phone]

## 2020-09-09 ENCOUNTER — HOSPITAL ENCOUNTER (OUTPATIENT)
Dept: SPEECH THERAPY | Age: 75
Setting detail: THERAPIES SERIES
Discharge: HOME | End: 2020-09-09
Payer: MEDICARE

## 2020-09-09 PROCEDURE — 92526 ORAL FUNCTION THERAPY: CPT

## 2020-09-09 NOTE — FLOWSHEET NOTE
Speech-Language Pathology  Daily Treatment Note    Date:  2020    Patient Name:  Naima Bermeo    :  1945  MRN: [id number]  Restrictions/Precautions:  Oropharyngeal Dysphagia; Minced and Moist Diet with Thin liquids; Still receiving PEG tube bolus feedings  Treatment Diagnosis:  R13.10 (ICD-10-CM) - Dysphagia   Insurance/Certification information:  Medicare, [address] [place] [address]  Referring Physician:  Rosangela Alamo MD     Plan of care signed (Y/N): Y   Visit# / total visits:    Pain level: 0/10     Progress Note: []  Yes  [x]  No  Next due by: Visit #10: 4/10 or 20     Subjective: The pt was in good spirits. He said that he feels like he is swallowing much better overall. He said that he has begun to try foods of increased texture with success. He said that he has began eating some foods such as shrimp, different types of meats and crackers at home. He said that he is tracking his calories on an felicity and has cut back his tube feedings from 4 cans a day to only 2 a day due to taking enough calories orally. He said that his weight has been steady at 195 lbs. Objective:   Short-Term Goals:  1. The pt will tolerate thin liquids without s/s of aspiration, gagging or other form of dysphagia 10/10:  - Thin water via cup:  with x 2 delayed, slight throat clearing. 2. The pt will tolerate soft solids without s/s of aspiraiton, gagging or other form of dysphagia 10/10:  - Goal targeted indirectly through other treatment tasks.       3. The pt will tolerate regular texture solids without s/s of aspiraiton, gagging or other form of dysphagia 10/10:   - Mixed Nuts: Tolerated 7/10; only x 3 instances of slight throat clearing post-swallow    Other Treatments:   - The following swallowing exercises were trained and completed:  · Madeline: x 10 reps  · Jutting jaw forward for count of 5: x 10 reps  · Pressing tongue to roof of mouth for count of 5: x 10 reps  · Lowering jaw against resistance: x 20 reps  · Shaker head lift: Did not target  · Falsetto EE: x 10 reps  · Mendelsohn Maneuver: x 10 reps  · Effortful swallow: x 10 reps    Assessment:   Significantly improved swallowing function with improved tolerance of solids of increased texture    Plan:   Continued Dysphagia treatment 2 x week for 6-8 weeks. Timed Code Treatment: 0 minutes    Total Treatment Time: 36 minutes ([id number])    Signature:     Kenan Aguilar MA, [address] [facility]  ID#[id number]  Speech-Language Pathologist  Phone #: [phone]  Fax #: [phone]

## 2020-09-14 ENCOUNTER — HOSPITAL ENCOUNTER (OUTPATIENT)
Dept: SPEECH THERAPY | Age: 75
Setting detail: THERAPIES SERIES
Discharge: HOME | End: 2020-09-14
Payer: MEDICARE

## 2020-09-14 PROCEDURE — 92526 ORAL FUNCTION THERAPY: CPT

## 2020-09-14 NOTE — FLOWSHEET NOTE
Speech-Language Pathology  Daily Treatment Note    Date:  2020    Patient Name:  Naima Bermeo    :  1945  MRN: [id number]  Restrictions/Precautions:  Oropharyngeal Dysphagia; Minced and Moist Diet with Thin liquids; Still receiving PEG tube bolus feedings  Treatment Diagnosis:  R13.10 (ICD-10-CM) - Dysphagia   Insurance/Certification information:  Medicare, [address] [place] [address]  Referring Physician:  Rosangela Alamo MD     Plan of care signed (Y/N): Y   Visit# / total visits:    Pain level: 0/10     Progress Note: []  Yes  [x]  No  Next due by: Visit #10: 5/10 or 20     Subjective: The pt was his typically pleasant self. He reported that he continues to swallow better, continuing to try more foods of increased texture. The pt said he had fried chicken, lasagna and pork chop taking in small bites without issue. He said x 1 larger pill suck in his throat. Need to take meds in puree was discussed. Objective:   Short-Term Goals:  1. The pt will tolerate thin liquids without s/s of aspiration, gagging or other form of dysphagia 10/10:  - Thin water via cup: 8/10; x 2 instances of throat clearing post-swallow    2. The pt will tolerate soft solids without s/s of aspiraiton, gagging or other form of dysphagia 10/10:  - Goal targeted indirectly through other treatment tasks. 3. The pt will tolerate regular texture solids without s/s of aspiraiton, gagging or other form of dysphagia 10/10:   - Mixed Nuts: Tolerated 9/10; only x 1 instance of slight throat clearing after the final nut taken    Other Treatments:   - The following swallowing exercises were trained and completed:  · Madeline: x 10 reps  · Jutting jaw forward for count of 5: x 20 reps (2 sets of 10)  · Pressing tongue to roof of mouth for count of 5: x 20 reps (2 sets of 10)  · Lowering jaw against resistance: x 20 reps  · Shaker head lift: Isokinetic: x 30 (3 sets of 10);  Isometric: Held position for 15 seconds on x 2 different occasions. · Falsetto EE: x 10 reps  · Mendelsohn Maneuver: x 10 reps    Assessment:   The pt continues to make significant progress in treatment with improved tolerance of solids of increased texture. A diet upgrade to Soft and Bite Sized (Dysphagia III) with thin liquids is recommended. Plan:   Continued Dysphagia treatment 2 x week for 6-8 weeks. Recommended Diet:  Solid consistency: Soft and Bite Sized (Dysphagia III)  Liquid consistency: Thin  Liquid administration via: Cup;Straw(Small, single sips)    Timed Code Treatment: 0 minutes    Total Treatment Time: 35 minutes ([id number])    Signature:     Kaitlyn Rodriguez MA, [address] [facility]#[id number]  Speech-Language Pathologist  Phone #: [phone]  Fax #: [phone]

## 2020-09-16 ENCOUNTER — HOSPITAL ENCOUNTER (OUTPATIENT)
Dept: SPEECH THERAPY | Age: 75
Setting detail: THERAPIES SERIES
Discharge: HOME | End: 2020-09-16
Payer: MEDICARE

## 2020-09-16 PROCEDURE — 92526 ORAL FUNCTION THERAPY: CPT

## 2020-09-16 NOTE — PROGRESS NOTES
Outpatient Speech Therapy   Phone: [phone] Fax: [phone]    Speech Therapy Discharge Note         The following patient has been evaluated for therapy services. Please review the attached summary of the patient's plan of care, and verify that you agree with plan for additional therapy services at this time.      Thank you for the referral of this patient. Please sign the attached certification form.      Physician signature_______________________ Date________________  Mode Kearns to: [facility] [phone]         Date: 2020        Patient Name:  Bri Stone    :  1945  MRN: [id number]  Restrictions/Precautions:  Oropharyngeal Dysphagia; Minced and Moist Diet with Thin liquids; Still receiving PEG tube bolus feedings  Treatment Diagnosis:  R13.10 (ICD-10-CM) - Dysphagia   Insurance/Certification information:  Medicare, [facility] [id number]  Referring Physician:  Ahsan Joshua MD     Plan of care signed (Y/N): Y   Visit# / total visits:    Pain level:      0/10         Time Period for Report:  20 to 20  Cancels/No-shows to date:  X 1 cancel and x 1 no-show    Plan of Care/Treatment to date:  [] Speech-Language Evaluation/Treatment    [x] Dysphagia Evaluation/Treatment        [] Dysphagia Treatment via Neuromuscular Electrical Stimulation (NMES)   [x] Modified Barium Swallowing Study  [] Fiberoptic Endoscopic Evaluation of Swallowing (FEES)    [] Cognitive-Linguistic Skills Development  [] Voice evaluation and Treatment      [] Evaluation, modification, and Training of Voice Prosthetic     [] Evaluation for Speech-Generating Augmentative and Alternative Communication Device   [] Therapeutic Services for the use of Speech-Generating Device. [] Other:     Significant Findings At Last Visit/Comments:    Subjective: The pt was accompanied by his wife.  He said that he consumed a full regular diet everyday since his last session with no dysphagia symptoms occurring. His wife confirmed this. He said that he continues to closely count his calories via an felicity and has stopped taking his PEG tube feedings completely. He said that he is scheduled to have his PEG removed next week. Objective:  Observation: Swallow function appears WFL, timely swallows and WFL laryngeal elevation per palpitation. Assessment:  · Summary: The pt is now demonstrating return to [facility] swallowing function. He is now tolerating a regular diet and thin liquids without dysphagia. Recommend D/C of Speech Therapy services 2/2 goals met.      Patient's response to treatment: Pleasant and cooperative. Progress towards goals:    Short-Term Goals:  1. The pt will tolerate thin liquids without s/s of aspiration, gagging or other form of dysphagia 10/10:  - Thin water via cup: 10/10  GOAL MET     2. The pt will tolerate soft solids without s/s of aspiraiton, gagging or other form of dysphagia 10/10:  - GOAL MET     3. The pt will tolerate regular texture solids without s/s of aspiraiton, gagging or other form of dysphagia 10/10:   - Mixed Nuts: Tolerated 10/10  GOAL MET    Current Frequency/Duration:  # Days per week: [] 1 day # Weeks: [] 1 week [] 4 weeks      [x] 2 days? [] 2 weeks [] 5 weeks      [] 3 days   [x] 3 weeks [] 6 weeks     Rehab Potential: [x] Excellent [] Good [] Fair  [] Poor     Goal Status:  [x] Achieved [] Partially Achieved  [] Not Achieved     Patient Status: [] Continue per initial plan of Care     [x] Patient now discharged 2/2 goals met     [] Additional visits requested, Please re-certify for additional visits:      Electronically signed by:  GUERITA Echavarria  Phone: [phone]  Fax: [phone]    If you have any questions or concerns, please don't hesitate to call.   Thank you for your referral.

## 2020-09-16 NOTE — FLOWSHEET NOTE
Speech-Language Pathology  Daily Treatment Note    Date:  2020    Patient Name:  Lan Vargas    :  1945  MRN: [id number]  Restrictions/Precautions:  Oropharyngeal Dysphagia; Minced and Moist Diet with Thin liquids; Still receiving PEG tube bolus feedings  Treatment Diagnosis:  R13.10 (ICD-10-CM) - Dysphagia   Insurance/Certification information:  Medicare, [address] [place] [address]  Referring Physician:  James Clifton MD     Plan of care signed (Y/N): Y   Visit# / total visits:    Pain level: 0/10     Progress Note: []  Yes  [x]  No  Next due by: Visit #10: 6/10 or 20     Subjective: The pt was accompanied by his wife. He said that he consumed a full regular diet everyday since his last session with no dysphagia symptoms occurring. His wife confirmed this. He said that he continues to closely count his calories via an felicity and has stopped taking his PEG tube feedings completely. He said that he is scheduled to have his PEG removed next week. Objective:   Short-Term Goals:  1. The pt will tolerate thin liquids without s/s of aspiration, gagging or other form of dysphagia 10/10:  - Thin water via cup: 10/10  GOAL MET    2. The pt will tolerate soft solids without s/s of aspiraiton, gagging or other form of dysphagia 10/10:  - GOAL MET    3. The pt will tolerate regular texture solids without s/s of aspiraiton, gagging or other form of dysphagia 10/10:   - Mixed Nuts: Tolerated 10/10  GOAL MET    Other Treatments:   - The following swallowing exercises were trained and completed:  · Madeline: x 5 reps  · Jutting jaw forward for count of 5: x 20 reps (2 sets of 10)  · Pressing tongue to roof of mouth for count of 5: x 20 reps (2 sets of 10)  · Lowering jaw against resistance: x 20 reps  · Shaker head lift: Isokinetic: x 30 (3 sets of 10); Isometric: Held position for 15 seconds on x 2 different occasions.   · Falsetto EE: x 10 reps  · Mendelsohn Maneuver: x 5 reps    Assessment:   The pt is now demonstrating return to [facility] swallowing function. He is now tolerating a regular die and thin liquids without dysphagia. Recommend D/C of Speech Therapy services 2/2 goals met. Plan:   Recommend D/C of Speech Therapy services 2/2 goals met. Recommend upgrade to a regular diet with thin liquids. Recommended Diet:  Solid consistency: Regular  Liquid consistency: Thin  Liquid administration via: Cup;Straw(Small, single sips)    Timed Code Treatment: 0 minutes    Total Treatment Time: 28 minutes (3411-1451)    Signature:     Vincent Mathew MA, [id number] [place] [address]  [id number]  Speech-Language Pathologist  Phone #: [phone]  Fax #: [phone]

## 2020-09-18 ENCOUNTER — NURSE ONLY (OUTPATIENT)
Dept: PRIMARY CARE CLINIC | Age: 75
End: 2020-09-18
Payer: MEDICARE

## 2020-09-18 PROCEDURE — 99211 OFF/OP EST MAY X REQ PHY/QHP: CPT | Performed by: NURSE PRACTITIONER

## 2020-09-20 LAB — SARS-COV-2, NAA: NOT DETECTED

## 2020-09-23 ENCOUNTER — ANESTHESIA EVENT (OUTPATIENT)
Dept: ENDOSCOPY | Age: 75
End: 2020-09-23
Payer: MEDICARE

## 2020-09-24 ENCOUNTER — ANESTHESIA (OUTPATIENT)
Dept: ENDOSCOPY | Age: 75
End: 2020-09-24
Payer: MEDICARE

## 2020-09-24 ENCOUNTER — HOSPITAL ENCOUNTER (OUTPATIENT)
Age: 75
Setting detail: OUTPATIENT SURGERY
Discharge: HOME | End: 2020-09-24
Attending: INTERNAL MEDICINE | Admitting: INTERNAL MEDICINE
Payer: MEDICARE

## 2020-09-24 VITALS
TEMPERATURE: 97.1 F | WEIGHT: 195 LBS | SYSTOLIC BLOOD PRESSURE: 114 MMHG | DIASTOLIC BLOOD PRESSURE: 64 MMHG | OXYGEN SATURATION: 98 % | HEART RATE: 68 BPM | HEIGHT: 73 IN | RESPIRATION RATE: 16 BRPM | BODY MASS INDEX: 25.84 KG/M2

## 2020-09-24 PROCEDURE — 7100000011 HC PHASE II RECOVERY - ADDTL 15 MIN: Performed by: INTERNAL MEDICINE

## 2020-09-24 PROCEDURE — 3609038000 HC PEG TUBE REMOVAL: Performed by: INTERNAL MEDICINE

## 2020-09-24 PROCEDURE — 7100000010 HC PHASE II RECOVERY - FIRST 15 MIN: Performed by: INTERNAL MEDICINE

## 2020-09-24 PROCEDURE — 2580000003 HC RX 258: Performed by: ANESTHESIOLOGY

## 2020-09-24 RX ORDER — CARVEDILOL 6.25 MG/1
6.25 TABLET ORAL 2 TIMES DAILY WITH MEALS
COMMUNITY

## 2020-09-24 RX ORDER — SODIUM CHLORIDE, SODIUM LACTATE, POTASSIUM CHLORIDE, CALCIUM CHLORIDE 600; 310; 30; 20 MG/100ML; MG/100ML; MG/100ML; MG/100ML
INJECTION, SOLUTION INTRAVENOUS CONTINUOUS
Status: DISCONTINUED | OUTPATIENT
Start: 2020-09-24 | End: 2020-09-24 | Stop reason: HOSPADM

## 2020-09-24 RX ORDER — HYDROCHLOROTHIAZIDE 25 MG/1
25 TABLET ORAL DAILY
COMMUNITY

## 2020-09-24 RX ORDER — SERTRALINE HYDROCHLORIDE 25 MG/1
25 TABLET, FILM COATED ORAL DAILY
COMMUNITY

## 2020-09-24 RX ORDER — LORAZEPAM 1 MG/1
1 TABLET ORAL EVERY 6 HOURS PRN
COMMUNITY

## 2020-09-24 RX ADMIN — SODIUM CHLORIDE, POTASSIUM CHLORIDE, SODIUM LACTATE AND CALCIUM CHLORIDE: 600; 310; 30; 20 INJECTION, SOLUTION INTRAVENOUS at 07:17

## 2020-09-24 ASSESSMENT — PULMONARY FUNCTION TESTS
PIF_VALUE: 0
PIF_VALUE: 2
PIF_VALUE: 0
PIF_VALUE: 1
PIF_VALUE: 0

## 2020-09-24 ASSESSMENT — PAIN - FUNCTIONAL ASSESSMENT: PAIN_FUNCTIONAL_ASSESSMENT: 0-10

## 2020-09-24 NOTE — PROGRESS NOTES
PEG removal performed at bedside in endoscopy department holding bay. Pt tolerated procedure well. PEG removed without difficulty by Dr. Joseph Watson. After PEG removed, area cleaned with Chlorhexidine Gluconate/Isopropyl Alcohol swab and site covered with dry gauze/ABD pad dressing.

## 2020-09-24 NOTE — OP NOTE
PEG site was inspected, it looked clean. After obtaining informed consent, the PEG tube was removed with one sharp pull. The patient tolerated the procedure well. No significant bleeding noted. The area was dressed with gauge. Rec:  NPO for 6 hours. Liquid diet for rest of the day. Can have regular diet tomorrow.     Monse Mancilla

## 2020-09-24 NOTE — H&P
History and Physical / Pre-Sedation Assessment      PMHx:   Past Medical History:   Diagnosis Date    Arthritis     ASHD (arteriosclerotic heart disease)     Cancer ([facility]     basal carcinoma on face and shoulder    Dizziness     Fracture of nasal bone     deviated septum    Hearing loss     HTN (hypertension)     Hyperlipidemia     [facility] Aug[facility] 2014    Dr. Cee Rosas    Migraine     Nosebleed     Prostate atrophy     Sleep apnea     Tinnitus        Medications:   Prior to Admission medications    Medication Sig Start Date End Date Taking? Authorizing Provider   hydroCHLOROthiazide (HYDRODIURIL) 25 MG tablet Take 25 mg by mouth daily   Yes Historical Provider, MD   carvedilol (COREG) 6.25 MG tablet Take 6.25 mg by mouth 2 times daily (with meals)   Yes Historical Provider, MD   sertraline (ZOLOFT) 25 MG tablet Take 25 mg by mouth daily   Yes Historical Provider, MD   LORazepam (ATIVAN) 1 MG tablet Take 1 mg by mouth every 6 hours as needed for Anxiety. Yes Historical Provider, MD   Multiple Vitamin (MVI, CELEBRATE, CHEWABLE TABLET) Take 1 tablet by mouth daily   Yes Historical Provider, MD   atorvastatin (LIPITOR) 40 MG tablet TAKE 1 TABLET BY MOUTH EVERY DAY 9/19/17  Yes Chantale Bella MD   lisinopril (PRINIVIL;ZESTRIL) 20 MG tablet TAKE 1 TABLET TWICE DAILY 5/22/17  Yes Chantale Bella MD   Vitamin D (CHOLECALCIFEROL) 1000 UNITS CAPS capsule Take 4,000 Units by mouth daily    Yes Historical Provider, MD   B Complex-C (SUPER B COMPLEX PO) Take by mouth   Yes Historical Provider, MD   aspirin 325 MG tablet Take 325 mg by mouth daily. Yes Historical Provider, MD   Ascorbic Acid (VITAMIN C) 500 MG tablet Take 1,000 mg by mouth daily    Yes Historical Provider, MD   amphetamine-dextroamphetamine (ADDERALL XR) 10 MG extended release capsule Take 10 mg by mouth 2 times daily. Historical Provider, MD       Allergies:    Allergies   Allergen Reactions    No Known Allergies      none PSHx:   Past Surgical History:   Procedure Laterality Date    APPENDECTOMY      CARDIAC SURGERY      COLONOSCOPY  Aug., 2004 ( 2014 )    Dr. Mary Arora - normal     COLONOSCOPY  Mar., 2015 ( 2025 )    Dr. Mckay Wilks - [facility]  Oct., 2004    HARVEY-LAD, Radial \"T\" to 1st diag, JANKI,OMII   [facility] GASTROSTOMY TUBE PLACEMENT  *July 22, 2020    Dr. Shelly Irizarry - EGD normal    LARYNGOSCOPY  *May 19, 2020    Dr. Anil Fong - biopsy of right base of tongue mass    OTHER SURGICAL HISTORY N/A 06/29/2018    CYSTOSCOPY, URETHRAL DILATION, TRANSURETHRAL RESECTION PROSTATE    PARATHYROIDECTOMY  Nov. 18, 2014     Dr. Yisel Yan - excision of a right upper parathyroid adenoma    PROSTATE BIOPSY  2001    benign    PROSTATE BIOPSY  July, 2011    Dr. Sylvia Coleman - benign - PSA 11.5    [facility] N/A 9/24/2020    REMOVAL PEG TUBE performed by Abdoulaye Hilton MD at [facility]  *June 29, 2018    Dr. Sylvia Coleman - BPH       Social Hx:   Social History     Socioeconomic History    Marital status:      Spouse name: Not on file    Number of children: 1    Years of education: Not on file    Highest education level: Not on file   Occupational History    Occupation: Delivery   Social Needs    Financial resource strain: Not on file    Food insecurity     Worry: Not on file     Inability: Not on file   [employer] needs     Medical: Not on file     Non-medical: Not on file   Tobacco Use    Smoking status: Former Smoker     Packs/day: 2.00     Years: 3.00     Pack years: 6.00     Types: Cigarettes    Smokeless tobacco: Former User     Quit date: 1/1/1978   Substance and Sexual Activity    Alcohol use:  Yes     Alcohol/week: 0.0 standard drinks     Comment: socially    Drug use: No    Sexual activity: Not on file   Lifestyle    Physical activity     Days per week: Not on file     Minutes per session: Not on file    Stress: Not on file Relationships    Social connections     Talks on phone: Not on file     Gets together: Not on file     Attends Islam service: Not on file     Active member of club or organization: Not on file     Attends meetings of clubs or organizations: Not on file     Relationship status: Not on file    Intimate partner violence     Fear of current or ex partner: Not on file     Emotionally abused: Not on file     Physically abused: Not on file     Forced sexual activity: Not on file   Other Topics Concern    Not on file   Social History Narrative    Living Will:  Yes       Family Hx:   Family History   Problem Relation Age of Onset    Other Mother 80         - Dementia    Cancer Father 61         - Lymphoma       Physical Exam:  Vital Signs: /64   Pulse 68   Temp 97.1 °F (36.2 °C) (Temporal)   Resp 16   Ht 6' 1\" (1.854 m)   Wt 195 lb (88.5 kg)   SpO2 98%   BMI 25.73 kg/m²    Airway: Mallampati: II (soft palate, uvula, fauces visible)  Pulmonary:Normal  Cardiac:Normal  Abdomen:Normal    Pre-Procedure Assessment / Plan:  ASA: Class 2 - A normal healthy patient with mild systemic disease  Level of Sedation Plan:No sedation  Post Procedure plan: Return to same level of care    I assessed the patient and find that the patient is in satisfactory condition to proceed with the planned procedure and sedation plan. I have explained the risk, benefits, and alternatives to the procedure; the patient understands and agrees to proceed.        Angelica Edmond  2020

## 2020-09-24 NOTE — ANESTHESIA PRE PROCEDURE
Allergen Reactions    No Known Allergies      none       Problem List:    Patient Active Problem List   Diagnosis Code    ASHD (arteriosclerotic heart disease) I25.10    Pure hypercholesterolemia E78.00    Essential hypertension I10       Past Medical History:        Diagnosis Date    Arthritis     ASHD (arteriosclerotic heart disease)     Cancer ([facility]     basal carcinoma on face and shoulder    Dizziness     Fracture of nasal bone     deviated septum    Hearing loss     HTN (hypertension)     Hyperlipidemia     Hyperparathyroidism ([facility] Aug., 2014    Dr. Kayy Mills    Migraine     Nosebleed     Prostate atrophy     Sleep apnea     Tinnitus        Past Surgical History:        Procedure Laterality Date    APPENDECTOMY      CARDIAC SURGERY      COLONOSCOPY  Aug., 2004 ( 2014 )    Dr. Ethel Vale - normal     COLONOSCOPY  Mar., 2015 ( 2025 )    Dr. Beck Lockhart - [facility]  Oct., 2004    LIMA-LAD, Radial \"T\" to 1st diag, JANKI,OMII   [facility] GASTROSTOMY TUBE PLACEMENT  *July 22, 2020    Dr. Kassy Grijalva - EGD normal    LARYNGOSCOPY  *May 19, 2020    Dr. Dorita Burk - biopsy of right base of tongue mass    OTHER SURGICAL HISTORY N/A 06/29/2018    CYSTOSCOPY, URETHRAL DILATION, TRANSURETHRAL RESECTION PROSTATE    PARATHYROIDECTOMY  Nov. 18, 2014     Dr. Edel Varela - excision of a right upper parathyroid adenoma    PROSTATE BIOPSY  2001    benign    PROSTATE BIOPSY  July, 2011    Dr. Isaiah Sexton - benign - PSA 11.5    [address]      TURP  *June 29, 2018    Dr. Isiaah Sexton - BPH       Social History:    Social History     Tobacco Use    Smoking status: Former Smoker     Packs/day: 2.00     Years: 3.00     Pack years: 6.00     Types: Cigarettes    Smokeless tobacco: Former User     Quit date: 1/1/1978   Substance Use Topics    Alcohol use:  Yes     Alcohol/week: 0.0 standard drinks     Comment: socially                                Counseling given: Not Answered      Vital Signs (Current):   Vitals:    09/24/20 0630   BP: 114/64   Pulse: 68   Resp: 16   Temp: 97.1 °F (36.2 °C)   TempSrc: Temporal   SpO2: 98%   Weight: 195 lb (88.5 kg)   Height: 6' 1\" (1.854 m)                                              BP Readings from Last 3 Encounters:   09/24/20 114/64   10/15/19 124/80   09/25/19 139/67       NPO Status:                                                                                 BMI:   Wt Readings from Last 3 Encounters:   09/24/20 195 lb (88.5 kg)   10/15/19 225 lb (102.1 kg)   09/25/19 229 lb 9.6 oz (104.1 kg)     Body mass index is 25.73 kg/m². CBC:   Lab Results   Component Value Date    WBC 9.4 01/22/2020    RBC 4.35 01/22/2020    HGB 13.2 01/22/2020    HCT 40.1 01/22/2020    MCV 92 01/22/2020    RDW 12.7 12/04/2019     01/22/2020       CMP:   Lab Results   Component Value Date     01/22/2020    K 4.5 01/22/2020     01/22/2020    CO2 24 01/22/2020    BUN 24 01/22/2020    CREATININE 0.69 01/22/2020    GFRAA >60 06/30/2018    LABGLOM >60 06/30/2018    GLUCOSE 100 01/22/2020    PROT 6.8 07/08/2019    PROT 6.9 12/04/2012    CALCIUM 9.6 01/22/2020    BILITOT 0.2 01/22/2020    BILITOT 0.4 02/22/2012    ALKPHOS 87 01/22/2020    AST 24 01/22/2020    ALT 27 01/22/2020       POC Tests: No results for input(s): POCGLU, POCNA, POCK, POCCL, POCBUN, POCHEMO, POCHCT in the last 72 hours.     Coags: No results found for: PROTIME, INR, APTT    HCG (If Applicable): No results found for: PREGTESTUR, PREGSERUM, HCG, HCGQUANT     ABGs: No results found for: PHART, PO2ART, UKZ0MBB, LSE9KUO, BEART, G1UFORVI     Type & Screen (If Applicable):  No results found for: LABABO, LABRH    Drug/Infectious Status (If Applicable):  No results found for: HIV, HEPCAB    COVID-19 Screening (If Applicable):   Lab Results   Component Value Date    COVID19 NOT DETECTED 09/18/2020         Anesthesia Evaluation  Patient summary reviewed no history of anesthetic complications:   Airway: Mallampati: III  TM distance: >3 FB   Neck ROM: full  Mouth opening: > = 3 FB Dental: normal exam         Pulmonary:   (+) sleep apnea: on CPAP,      (-) recent URI                           Cardiovascular:    (+) hypertension:, CAD:, CABG/stent (CABG 2004):,                   Neuro/Psych:   (+) headaches: migraine headaches,             GI/Hepatic/Renal: Neg GI/Hepatic/Renal ROS            Endo/Other:                      ROS comment: Hx of hyperparathyroidism  tx with surgery   Hx of Ca of tongue and tx with radiation and chemo  Abdominal:           Vascular:                                        Anesthesia Plan      general     ASA 3       Induction: intravenous. Anesthetic plan and risks discussed with patient.                       Hair Laws MD   9/24/2020

## 2020-09-24 NOTE — PROGRESS NOTES
Ambulatory Surgery/Procedure Discharge Note    Vitals:    09/24/20 0630   BP: 114/64   Pulse: 68   Resp: 16   Temp: 97.1 °F (36.2 °C)   SpO2: 98%       No intake/output data recorded. Restroom use offered before discharge. Yes  Discharge instructions were read and no other questions ask. Verbalize understanding of same. Extra gauze and tape goes with the patient. Pain assessment:  none  Pain Level: 0        Patient discharged to home/self care.  Patient discharged walk to the waiting room family/S.O.       9/24/2020 8:26 AM

## 2023-03-13 RX ORDER — AMLODIPINE BESYLATE 5 MG/1
5 TABLET ORAL DAILY
COMMUNITY

## 2023-03-13 RX ORDER — ATORVASTATIN CALCIUM 80 MG/1
80 TABLET, FILM COATED ORAL DAILY
COMMUNITY

## 2023-03-13 RX ORDER — EZETIMIBE 10 MG/1
10 TABLET ORAL DAILY
COMMUNITY

## 2023-03-15 ENCOUNTER — OFFICE VISIT (OUTPATIENT)
Dept: CARDIOTHORACIC SURGERY | Age: 78
End: 2023-03-15
Payer: MEDICARE

## 2023-03-15 VITALS
HEART RATE: 58 BPM | TEMPERATURE: 98.4 F | HEIGHT: 72 IN | SYSTOLIC BLOOD PRESSURE: 148 MMHG | BODY MASS INDEX: 26.55 KG/M2 | WEIGHT: 196 LBS | OXYGEN SATURATION: 98 % | DIASTOLIC BLOOD PRESSURE: 70 MMHG

## 2023-03-15 DIAGNOSIS — R91.1 SOLITARY LUNG NODULE: Primary | ICD-10-CM

## 2023-03-15 PROCEDURE — 1036F TOBACCO NON-USER: CPT | Performed by: THORACIC SURGERY (CARDIOTHORACIC VASCULAR SURGERY)

## 2023-03-15 PROCEDURE — G8417 CALC BMI ABV UP PARAM F/U: HCPCS | Performed by: THORACIC SURGERY (CARDIOTHORACIC VASCULAR SURGERY)

## 2023-03-15 PROCEDURE — 3077F SYST BP >= 140 MM HG: CPT | Performed by: THORACIC SURGERY (CARDIOTHORACIC VASCULAR SURGERY)

## 2023-03-15 PROCEDURE — 1123F ACP DISCUSS/DSCN MKR DOCD: CPT | Performed by: THORACIC SURGERY (CARDIOTHORACIC VASCULAR SURGERY)

## 2023-03-15 PROCEDURE — 3078F DIAST BP <80 MM HG: CPT | Performed by: THORACIC SURGERY (CARDIOTHORACIC VASCULAR SURGERY)

## 2023-03-15 PROCEDURE — G8427 DOCREV CUR MEDS BY ELIG CLIN: HCPCS | Performed by: THORACIC SURGERY (CARDIOTHORACIC VASCULAR SURGERY)

## 2023-03-15 PROCEDURE — G8484 FLU IMMUNIZE NO ADMIN: HCPCS | Performed by: THORACIC SURGERY (CARDIOTHORACIC VASCULAR SURGERY)

## 2023-03-15 PROCEDURE — 99204 OFFICE O/P NEW MOD 45 MIN: CPT | Performed by: THORACIC SURGERY (CARDIOTHORACIC VASCULAR SURGERY)

## 2023-03-15 ASSESSMENT — ENCOUNTER SYMPTOMS
COUGH: 0
ALLERGIC/IMMUNOLOGIC NEGATIVE: 1
GASTROINTESTINAL NEGATIVE: 1
APNEA: 1
EYES NEGATIVE: 1
CHEST TIGHTNESS: 0
TROUBLE SWALLOWING: 1
SHORTNESS OF BREATH: 0

## 2023-03-15 NOTE — PROGRESS NOTES
Subjective:      Patient ID: John Ding is a 69 y.o. male. Chief Complaint   Patient presents with    New Patient     Mr. Daniela Cherry is being seen today for enlarging lung nodule. HPI    Review of Systems   Constitutional: Negative. HENT:  Positive for trouble swallowing. Negative for dental problem and nosebleeds. Eyes: Negative. Wears glasses   Respiratory:  Positive for apnea (Wears CPAP at night). Negative for cough, chest tightness and shortness of breath. Cardiovascular: Negative. Gastrointestinal: Negative. Endocrine: Negative. Genitourinary: Negative. Musculoskeletal: Negative. Skin: Negative. Allergic/Immunologic: Negative. Neurological:  Positive for dizziness and light-headedness. Negative for seizures and headaches.        + neuropathy in lower legs   Hematological:  Bruises/bleeds easily. Psychiatric/Behavioral:  Negative for sleep disturbance. The patient is not nervous/anxious.     Past Medical History:   Diagnosis Date    Arthritis     ASHD (arteriosclerotic heart disease)     BPH (benign prostatic hyperplasia)     Cancer (HCC)     basal carcinoma on face and shoulder    Carotid stenosis     Bilateral    Dizziness     Fracture of nasal bone     deviated septum    Hearing loss     HTN (hypertension)     Hyperlipidemia     Hyperparathyroidism ([address] 08/2014    Dr. Ajit Mcbride    Migraine     Nosebleed     SHABBIR (obstructive sleep apnea)     Uses CPAP    Parathyroid adenoma     Prostate atrophy     Pulmonary nodule     Sleep apnea     Squamous cell carcinoma of base of tongue ([address] 2020    s/p chemo, XRT    Tinnitus      Past Surgical History:   Procedure Laterality Date    APPENDECTOMY      CARDIAC SURGERY      COLONOSCOPY  Aug., 2004 ( 2014 )    Dr. Guillen Levo - normal     COLONOSCOPY  Mar., 2015 ( 2025 )    Dr. Scot Tejeda - [facility]  Oct., 2004    LIMA-LAD, Radial \"T\" to 1st diag, [facility]  *July 22, 2020 Dr. Christoph Cantu - EGD normal    LARYNGOSCOPY  *May 19, 2020    Dr. Brissa Alfonso - biopsy of right base of tongue mass    OTHER SURGICAL HISTORY N/A 06/29/2018    CYSTOSCOPY, URETHRAL DILATION, TRANSURETHRAL RESECTION PROSTATE    PARATHYROIDECTOMY  Nov. 18, 2014     Dr. Keyur Saucedo - excision of a right upper parathyroid adenoma    PROSTATE BIOPSY  2001    benign    PROSTATE BIOPSY  July, 2011    Dr. Mitzi Randolph - benign - PSA 11.5    [address] N/A 9/24/2020    REMOVAL PEG TUBE performed by Celina Colorado MD at [facility]      TURP  *June 29, 2018    Dr. Mitzi Randolph - BPH     Allergies   Allergen Reactions    No Known Allergies      none     Current Outpatient Medications   Medication Sig Dispense Refill    amLODIPine (NORVASC) 5 MG tablet Take 5 mg by mouth daily      atorvastatin (LIPITOR) 80 MG tablet Take 80 mg by mouth daily      ezetimibe (ZETIA) 10 MG tablet Take 10 mg by mouth daily      carvedilol (COREG) 6.25 MG tablet Take 6.25 mg by mouth 2 times daily (with meals)      sertraline (ZOLOFT) 25 MG tablet Take 25 mg by mouth daily      LORazepam (ATIVAN) 1 MG tablet Take 1 mg by mouth every 6 hours as needed for Anxiety. Multiple Vitamin (MVI, CELEBRATE, CHEWABLE TABLET) Take 1 tablet by mouth daily      lisinopril (PRINIVIL;ZESTRIL) 20 MG tablet TAKE 1 TABLET TWICE DAILY (Patient taking differently: Take 20 mg by mouth daily) 180 tablet 3    Vitamin D (CHOLECALCIFEROL) 1000 UNITS CAPS capsule Take 4,000 Units by mouth daily       B Complex-C (SUPER B COMPLEX PO) Take by mouth      aspirin 325 MG tablet Take 325 mg by mouth daily. Ascorbic Acid (VITAMIN C) 500 MG tablet Take 1,000 mg by mouth daily       amphetamine-dextroamphetamine (ADDERALL XR) 10 MG extended release capsule Take 10 mg by mouth 2 times daily. No current facility-administered medications for this visit.      Social History     Socioeconomic History    Marital status:      Spouse name: Not on file    Number of children: 1    Years of education: Not on file    Highest education level: Not on file   Occupational History    Occupation: Delivery   Tobacco Use    Smoking status: Former     Packs/day: 2.00     Years: 3.00     Pack years: 6.00     Types: Cigarettes     Quit date: 1     Years since quittin.2    Smokeless tobacco: Former     Quit date: 1978   Vaping Use    Vaping Use: Never used   Substance and Sexual Activity    Alcohol use: Yes     Comment: 1 drink daily    Drug use: No    Sexual activity: Not on file   Other Topics Concern    Not on file   Social History Narrative    Living Will:  Yes     Social Determinants of Health     Financial Resource Strain: Not on file   Food Insecurity: Not on file   Transportation Needs: Not on file   Physical Activity: Not on file   Stress: Not on file   Social Connections: Not on file   Intimate Partner Violence: Not on file   Housing Stability: Not on file     Family History   Problem Relation Age of Onset    Other Mother 80         - Dementia    Cancer Father 61         - Lymphoma      Objective:   Physical Exam  Vitals:    03/15/23 1432   BP: (!) 148/70   Site: Left Upper Arm   Position: Sitting   Pulse: 58   Temp: 98.4 °F (36.9 °C)   TempSrc: Infrared   SpO2: 98%   Weight: 196 lb (88.9 kg)   Height: 6' (1.829 m)      Assessment:      ***      Plan:      ***

## 2023-03-15 NOTE — PROGRESS NOTES
Subjective:      Patient ID: Arleen Virgen is a 69 y.o. male. Chief Complaint   Patient presents with    New Patient     Mr. Edilberto Shultz is being seen today for enlarging lung nodule. HPI    Arleen Virgen is a 69 y.o. man who underwent CABG x3 19 years ago and has followed with Dr. Haleigh Huffman, his cardiologist, since. He was having symptoms of vertigo in 2020 and upon referral to ENT was incidentally noted to have a right base of tongue cancer for which he underwent chemoradiation. Staging scans and PET/CT revealed a right lower lobe ground glass nodule with a solid component that has slowly grown in size, with the ground glass portion measuring 2.2 x 1.9 cm that does have PET avidity, albeit with a max SUV of 1.5, suspicious for neoplasm. He presents to the office today to discuss these findings. He underwent recent PFTs in January revealing FEV1 70% predicted and DLCO 91% predicted. Of note, he underwent an MRI of the brain in October 2022 during a work up for his vertigo without any findings suggestive of metastatic disease. Review of Systems   Constitutional: Negative. HENT:  Positive for trouble swallowing. Negative for dental problem and nosebleeds. Eyes: Negative. Wears glasses   Respiratory:  Positive for apnea (Wears CPAP at night). Negative for cough, chest tightness and shortness of breath. Cardiovascular: Negative. Gastrointestinal: Negative. Endocrine: Negative. Genitourinary: Negative. Musculoskeletal: Negative. Skin: Negative. Allergic/Immunologic: Negative. Neurological:  Positive for dizziness and light-headedness. Negative for seizures and headaches.        + neuropathy in lower legs   Hematological:  Bruises/bleeds easily. Psychiatric/Behavioral:  Negative for sleep disturbance. The patient is not nervous/anxious.     Past Medical History:   Diagnosis Date    Arthritis     ASHD (arteriosclerotic heart disease)     BPH (benign prostatic hyperplasia) Cancer ([facility]     basal carcinoma on face and shoulder    Carotid stenosis     Bilateral    Dizziness     Fracture of nasal bone     deviated septum    Hearing loss     HTN (hypertension)     Hyperlipidemia     Hyperpar[facility] ([facility] 08/2014    Dr. Jaylin Cantu    Migraine     Nosebleed     SHABBIR (obstructive sleep apnea)     Uses CPAP    Parathyroid adenoma     Prostate atrophy     Pulmonary nodule     Sleep apnea     Squamous cell carcinoma of base of tongue ([facility] 2020    s/p chemo, XRT    Tinnitus      Past Surgical History:   Procedure Laterality Date    APPENDECTOMY      CARDIAC SURGERY      COLONOSCOPY  Aug., 2004 ( 2014 )    Dr. Alston Second - normal     COLONOSCOPY  Mar., 2015 ( 2025 )    Dr. Chaya Marie  Oct., 2004    LIMA-LAD, Radial \"T\" to 1st diag, [facility]  *July 22, 2020    Dr. Susan Stewart - EGD normal    LARYNGOSCOPY  *May 19, 2020    Dr. Elle Ortiz - biopsy of right base of tongue mass    OTHER SURGICAL HISTORY N/A 06/29/2018    CYSTOSCOPY, URETHRAL DILATION, TRANSURETHRAL RESECTION PROSTATE    PARATHYROIDECTOMY  Nov. 18, 2014     Dr. Emma Barbosa - excision of a right upper parathyroid adenoma    PROSTATE BIOPSY  2001    benign    PROSTATE BIOPSY  July, 2011    Dr. Rusty Moncada - benign - PSA 11.5    [address] [facility] [address] N/A 9/24/2020    REMOVAL PEG TUBE performed by Michael Ordoñez MD at [facility]  *June 29, 2018    Dr. Rusty Moncada - BPH     Allergies   Allergen Reactions    No Known Allergies      none     Current Outpatient Medications   Medication Sig Dispense Refill    amLODIPine (NORVASC) 5 MG tablet Take 5 mg by mouth daily      atorvastatin (LIPITOR) 80 MG tablet Take 80 mg by mouth daily      ezetimibe (ZETIA) 10 MG tablet Take 10 mg by mouth daily      carvedilol (COREG) 6.25 MG tablet Take 6.25 mg by mouth 2 times daily (with meals)      sertraline (ZOLOFT) 25 MG tablet Take 25 mg by mouth daily LORazepam (ATIVAN) 1 MG tablet Take 1 mg by mouth every 6 hours as needed for Anxiety. Multiple Vitamin (MVI, CELEBRATE, CHEWABLE TABLET) Take 1 tablet by mouth daily      lisinopril (PRINIVIL;ZESTRIL) 20 MG tablet TAKE 1 TABLET TWICE DAILY (Patient taking differently: Take 20 mg by mouth daily) 180 tablet 3    Vitamin D (CHOLECALCIFEROL) 1000 UNITS CAPS capsule Take 4,000 Units by mouth daily       B Complex-C (SUPER B COMPLEX PO) Take by mouth      aspirin 325 MG tablet Take 325 mg by mouth daily. Ascorbic Acid (VITAMIN C) 500 MG tablet Take 1,000 mg by mouth daily       amphetamine-dextroamphetamine (ADDERALL XR) 10 MG extended release capsule Take 10 mg by mouth 2 times daily. No current facility-administered medications for this visit.      Social History     Socioeconomic History    Marital status:      Spouse name: Not on file    Number of children: 1    Years of education: Not on file    Highest education level: Not on file   Occupational History    Occupation: Delivery   Tobacco Use    Smoking status: Former     Packs/day: 2.00     Years: 3.00     Pack years: 6.00     Types: Cigarettes     Quit date:      Years since quittin.2    Smokeless tobacco: Former     Quit date: 1978   Vaping Use    Vaping Use: Never used   Substance and Sexual Activity    Alcohol use: Yes     Comment: 1 drink daily    Drug use: No    Sexual activity: Not on file   Other Topics Concern    Not on file   Social History Narrative    Living Will:  Yes     Social Determinants of Health     Financial Resource Strain: Not on file   Food Insecurity: Not on file   Transportation Needs: Not on file   Physical Activity: Not on file   Stress: Not on file   Social Connections: Not on file   Intimate Partner Violence: Not on file   Housing Stability: Not on file     Family History   Problem Relation Age of Onset    Other Mother 80         - Dementia    Cancer Father 61         - Lymphoma Objective:   Physical Exam  Vitals reviewed. Constitutional:       Appearance: Normal appearance. He is normal weight. HENT:      Head: Normocephalic and atraumatic. Mouth/Throat:      Pharynx: Oropharynx is clear. No oropharyngeal exudate. Eyes:      Extraocular Movements: Extraocular movements intact. Pupils: Pupils are equal, round, and reactive to light. Cardiovascular:      Rate and Rhythm: Normal rate and regular rhythm. Pulses: Normal pulses. Heart sounds: Normal heart sounds. No murmur heard. Pulmonary:      Effort: Pulmonary effort is normal. No respiratory distress. Breath sounds: Normal breath sounds. No wheezing or rales. Abdominal:      General: Abdomen is flat. There is no distension. Palpations: Abdomen is soft. Tenderness: There is no abdominal tenderness. Musculoskeletal:      Cervical back: Normal range of motion and neck supple. Right lower leg: No edema. Left lower leg: No edema. Skin:     General: Skin is warm and dry. Neurological:      General: No focal deficit present. Mental Status: He is alert and oriented to person, place, and time. Psychiatric:         Mood and Affect: Mood normal.         Behavior: Behavior normal.         Thought Content: Thought content normal.         Judgment: Judgment normal.     Vitals:    03/15/23 1432   BP: (!) 148/70   Site: Left Upper Arm   Position: Sitting   Pulse: 58   Temp: 98.4 °F (36.9 °C)   TempSrc: Infrared   SpO2: 98%   Weight: 196 lb (88.9 kg)   Height: 6' (1.829 m)      Assessment:      Clarisse Teixeira is a 69 y.o. man with a history of CAD s/p CABG x3 19 years ago who now has an enlarging right  lower lobe nodule. Plan:      His enlarging nodule with subtle yet present PET avidity higher than the lung background activity is suspicious for neoplasm.  It is unclear if this would represent metastasis from his squamous cell carcinoma of the right base of tongue for which he underwent chemoradiation in 2020, or if this represents an early stage primary lung cancer. Its appearance is more suggestive of an indolent adenocarcinoma in situ. The size and appearance makes it unlikely that a percutaneous biopsy would add much as a negative biopsy would not be definitive, and a positive biopsy would then lead to surgical resection. His PFTs support his ability to tolerate a lobectomy, although due to the small size of the solid component he would have a similar expected survival with a sub-lobar resection as well if feasible. Prior to surgical intervention, he will need cardiac clearance, and he was referred back to Dr. Rubens Rae. He was already scheduled to undergo a stress test in June, and ideally this would be expedited to evaluate his cardiac risk prior to thoracic surgical intervention. Rajesh Gupta MD  Cardiothoracic Surgery PGY-5    I saw and evaluated the patient. I agree with the findings/plan of care in the fellow's note.       Brock Boyle MD  3/15/2023  4:56 PM

## 2023-03-16 ENCOUNTER — TELEPHONE (OUTPATIENT)
Dept: CARDIOTHORACIC SURGERY | Age: 78
End: 2023-03-16

## 2023-03-16 NOTE — TELEPHONE ENCOUNTER
Spoke with patient regarding surgery scheduled for 3/27/2023 at 12:30 pm with arrival time of 10:30 am at [facility]. with Dr. Kayla Goldstein to include: right video assisted thoracoscopic surgery with wedge excision of right lower lobe nodule and possible lobectomy. Patient to complete pre-admit lab work with a nurse from the hospital calling him to coordinate. Patient has been instructed not to eat or drink after midnight the day of surgery and to call our office with any questions or concerns.

## 2023-03-16 NOTE — TELEPHONE ENCOUNTER
Patient seen by Dr. Mcrae Gain gopi in office. He would like for patient to have cardiac clearance. I called Dr. Shin Ek office and advised that patient needs cardiac clearance prior to scheduling lung surgery. They will reach out to patient. I also called patient and asked that he let me know when he is scheduled for his cardiac clearance testing. He is aware and agreeable.

## 2023-03-17 ENCOUNTER — TELEPHONE (OUTPATIENT)
Dept: CARDIOTHORACIC SURGERY | Age: 78
End: 2023-03-17

## 2023-04-14 ENCOUNTER — ANESTHESIA EVENT (OUTPATIENT)
Dept: OPERATING ROOM | Age: 78
End: 2023-04-14
Payer: MEDICARE

## 2023-04-17 ENCOUNTER — ANESTHESIA (OUTPATIENT)
Dept: OPERATING ROOM | Age: 78
End: 2023-04-17
Payer: MEDICARE

## 2023-04-18 ENCOUNTER — HOSPITAL ENCOUNTER (INPATIENT)
Age: 78
LOS: 3 days | Discharge: HOME | DRG: 204 | End: 2023-04-21
Attending: THORACIC SURGERY (CARDIOTHORACIC VASCULAR SURGERY) | Admitting: THORACIC SURGERY (CARDIOTHORACIC VASCULAR SURGERY)
Payer: MEDICARE

## 2023-04-18 ENCOUNTER — APPOINTMENT (OUTPATIENT)
Dept: GENERAL RADIOLOGY | Age: 78
DRG: 204 | End: 2023-04-18
Attending: THORACIC SURGERY (CARDIOTHORACIC VASCULAR SURGERY)
Payer: MEDICARE

## 2023-04-18 DIAGNOSIS — R91.1 NODULE OF LOWER LOBE OF RIGHT LUNG: ICD-10-CM

## 2023-04-18 DIAGNOSIS — G89.18 POST-OP PAIN: Primary | ICD-10-CM

## 2023-04-18 DIAGNOSIS — Z90.2 S/P LOBECTOMY OF LUNG: ICD-10-CM

## 2023-04-18 PROBLEM — R91.8 RIGHT LOWER LOBE LUNG MASS: Status: ACTIVE | Noted: 2023-04-18

## 2023-04-18 LAB
ABO + RH BLD: NORMAL
BLD GP AB SCN SERPL QL: NORMAL
GLUCOSE BLD-MCNC: 132 MG/DL (ref 70–99)
GLUCOSE BLD-MCNC: 143 MG/DL (ref 70–99)
PERFORMED ON: ABNORMAL
PERFORMED ON: ABNORMAL

## 2023-04-18 PROCEDURE — 2580000003 HC RX 258: Performed by: ANESTHESIOLOGY

## 2023-04-18 PROCEDURE — 6360000002 HC RX W HCPCS: Performed by: FAMILY MEDICINE

## 2023-04-18 PROCEDURE — 2500000003 HC RX 250 WO HCPCS: Performed by: THORACIC SURGERY (CARDIOTHORACIC VASCULAR SURGERY)

## 2023-04-18 PROCEDURE — 2500000003 HC RX 250 WO HCPCS: Performed by: ANESTHESIOLOGY

## 2023-04-18 PROCEDURE — C1729 CATH, DRAINAGE: HCPCS | Performed by: THORACIC SURGERY (CARDIOTHORACIC VASCULAR SURGERY)

## 2023-04-18 PROCEDURE — 2500000003 HC RX 250 WO HCPCS: Performed by: NURSE ANESTHETIST, CERTIFIED REGISTERED

## 2023-04-18 PROCEDURE — 6360000002 HC RX W HCPCS: Performed by: SURGERY

## 2023-04-18 PROCEDURE — 88307 TISSUE EXAM BY PATHOLOGIST: CPT

## 2023-04-18 PROCEDURE — 88342 IMHCHEM/IMCYTCHM 1ST ANTB: CPT

## 2023-04-18 PROCEDURE — A4217 STERILE WATER/SALINE, 500 ML: HCPCS | Performed by: THORACIC SURGERY (CARDIOTHORACIC VASCULAR SURGERY)

## 2023-04-18 PROCEDURE — 7100000001 HC PACU RECOVERY - ADDTL 15 MIN: Performed by: THORACIC SURGERY (CARDIOTHORACIC VASCULAR SURGERY)

## 2023-04-18 PROCEDURE — 86850 RBC ANTIBODY SCREEN: CPT

## 2023-04-18 PROCEDURE — 6370000000 HC RX 637 (ALT 250 FOR IP): Performed by: SURGERY

## 2023-04-18 PROCEDURE — 6360000002 HC RX W HCPCS: Performed by: ANESTHESIOLOGY

## 2023-04-18 PROCEDURE — 86900 BLOOD TYPING SEROLOGIC ABO: CPT

## 2023-04-18 PROCEDURE — 3600000014 HC SURGERY LEVEL 4 ADDTL 15MIN: Performed by: THORACIC SURGERY (CARDIOTHORACIC VASCULAR SURGERY)

## 2023-04-18 PROCEDURE — 2580000003 HC RX 258: Performed by: THORACIC SURGERY (CARDIOTHORACIC VASCULAR SURGERY)

## 2023-04-18 PROCEDURE — 3700000001 HC ADD 15 MINUTES (ANESTHESIA): Performed by: THORACIC SURGERY (CARDIOTHORACIC VASCULAR SURGERY)

## 2023-04-18 PROCEDURE — 88360 TUMOR IMMUNOHISTOCHEM/MANUAL: CPT

## 2023-04-18 PROCEDURE — 3700000000 HC ANESTHESIA ATTENDED CARE: Performed by: THORACIC SURGERY (CARDIOTHORACIC VASCULAR SURGERY)

## 2023-04-18 PROCEDURE — 3600000004 HC SURGERY LEVEL 4 BASE: Performed by: THORACIC SURGERY (CARDIOTHORACIC VASCULAR SURGERY)

## 2023-04-18 PROCEDURE — 2580000003 HC RX 258: Performed by: SURGERY

## 2023-04-18 PROCEDURE — 2700000000 HC OXYGEN THERAPY PER DAY

## 2023-04-18 PROCEDURE — 6360000002 HC RX W HCPCS: Performed by: THORACIC SURGERY (CARDIOTHORACIC VASCULAR SURGERY)

## 2023-04-18 PROCEDURE — 1200000000 HC SEMI PRIVATE

## 2023-04-18 PROCEDURE — 88341 IMHCHEM/IMCYTCHM EA ADD ANTB: CPT

## 2023-04-18 PROCEDURE — 2709999900 HC NON-CHARGEABLE SUPPLY: Performed by: THORACIC SURGERY (CARDIOTHORACIC VASCULAR SURGERY)

## 2023-04-18 PROCEDURE — 7100000000 HC PACU RECOVERY - FIRST 15 MIN: Performed by: THORACIC SURGERY (CARDIOTHORACIC VASCULAR SURGERY)

## 2023-04-18 PROCEDURE — 86901 BLOOD TYPING SEROLOGIC RH(D): CPT

## 2023-04-18 PROCEDURE — C9290 INJ, BUPIVACAINE LIPOSOME: HCPCS | Performed by: THORACIC SURGERY (CARDIOTHORACIC VASCULAR SURGERY)

## 2023-04-18 PROCEDURE — 6360000002 HC RX W HCPCS

## 2023-04-18 PROCEDURE — 2720000010 HC SURG SUPPLY STERILE: Performed by: THORACIC SURGERY (CARDIOTHORACIC VASCULAR SURGERY)

## 2023-04-18 PROCEDURE — 71045 X-RAY EXAM CHEST 1 VIEW: CPT

## 2023-04-18 PROCEDURE — C1725 CATH, TRANSLUMIN NON-LASER: HCPCS | Performed by: THORACIC SURGERY (CARDIOTHORACIC VASCULAR SURGERY)

## 2023-04-18 PROCEDURE — 94761 N-INVAS EAR/PLS OXIMETRY MLT: CPT

## 2023-04-18 RX ORDER — ATORVASTATIN CALCIUM 80 MG/1
80 TABLET, FILM COATED ORAL DAILY
Status: DISCONTINUED | OUTPATIENT
Start: 2023-04-18 | End: 2023-04-21 | Stop reason: HOSPADM

## 2023-04-18 RX ORDER — GLYCOPYRROLATE 0.2 MG/ML
INJECTION INTRAMUSCULAR; INTRAVENOUS PRN
Status: DISCONTINUED | OUTPATIENT
Start: 2023-04-18 | End: 2023-04-18 | Stop reason: SDUPTHER

## 2023-04-18 RX ORDER — HYDRALAZINE HYDROCHLORIDE 20 MG/ML
5 INJECTION INTRAMUSCULAR; INTRAVENOUS EVERY 6 HOURS PRN
Status: DISCONTINUED | OUTPATIENT
Start: 2023-04-18 | End: 2023-04-21 | Stop reason: HOSPADM

## 2023-04-18 RX ORDER — ASPIRIN 325 MG
325 TABLET ORAL DAILY
Status: DISCONTINUED | OUTPATIENT
Start: 2023-04-19 | End: 2023-04-21 | Stop reason: HOSPADM

## 2023-04-18 RX ORDER — LORAZEPAM 1 MG/1
1 TABLET ORAL EVERY 6 HOURS PRN
Status: DISCONTINUED | OUTPATIENT
Start: 2023-04-18 | End: 2023-04-21 | Stop reason: HOSPADM

## 2023-04-18 RX ORDER — LORAZEPAM 2 MG/ML
0.5 INJECTION INTRAMUSCULAR
Status: COMPLETED | OUTPATIENT
Start: 2023-04-18 | End: 2023-04-18

## 2023-04-18 RX ORDER — ONDANSETRON 4 MG/1
4 TABLET, ORALLY DISINTEGRATING ORAL EVERY 8 HOURS PRN
Status: DISCONTINUED | OUTPATIENT
Start: 2023-04-18 | End: 2023-04-21 | Stop reason: HOSPADM

## 2023-04-18 RX ORDER — SODIUM CHLORIDE, SODIUM LACTATE, POTASSIUM CHLORIDE, CALCIUM CHLORIDE 600; 310; 30; 20 MG/100ML; MG/100ML; MG/100ML; MG/100ML
INJECTION, SOLUTION INTRAVENOUS CONTINUOUS
Status: DISCONTINUED | OUTPATIENT
Start: 2023-04-18 | End: 2023-04-18 | Stop reason: HOSPADM

## 2023-04-18 RX ORDER — DEXAMETHASONE SODIUM PHOSPHATE 4 MG/ML
INJECTION, SOLUTION INTRA-ARTICULAR; INTRALESIONAL; INTRAMUSCULAR; INTRAVENOUS; SOFT TISSUE PRN
Status: DISCONTINUED | OUTPATIENT
Start: 2023-04-18 | End: 2023-04-18 | Stop reason: SDUPTHER

## 2023-04-18 RX ORDER — OXYCODONE HYDROCHLORIDE 5 MG/1
5 TABLET ORAL EVERY 4 HOURS PRN
Status: DISCONTINUED | OUTPATIENT
Start: 2023-04-18 | End: 2023-04-21 | Stop reason: HOSPADM

## 2023-04-18 RX ORDER — AMLODIPINE BESYLATE 5 MG/1
5 TABLET ORAL DAILY
Status: DISCONTINUED | OUTPATIENT
Start: 2023-04-19 | End: 2023-04-21 | Stop reason: HOSPADM

## 2023-04-18 RX ORDER — MIDAZOLAM HYDROCHLORIDE 1 MG/ML
2 INJECTION INTRAMUSCULAR; INTRAVENOUS
Status: DISCONTINUED | OUTPATIENT
Start: 2023-04-18 | End: 2023-04-18 | Stop reason: HOSPADM

## 2023-04-18 RX ORDER — SODIUM CHLORIDE 9 MG/ML
25 INJECTION, SOLUTION INTRAVENOUS PRN
Status: DISCONTINUED | OUTPATIENT
Start: 2023-04-18 | End: 2023-04-18 | Stop reason: HOSPADM

## 2023-04-18 RX ORDER — ACETAMINOPHEN 500 MG
1000 TABLET ORAL EVERY 6 HOURS
Status: DISCONTINUED | OUTPATIENT
Start: 2023-04-18 | End: 2023-04-21 | Stop reason: HOSPADM

## 2023-04-18 RX ORDER — FENTANYL CITRATE 50 UG/ML
INJECTION, SOLUTION INTRAMUSCULAR; INTRAVENOUS PRN
Status: DISCONTINUED | OUTPATIENT
Start: 2023-04-18 | End: 2023-04-18 | Stop reason: SDUPTHER

## 2023-04-18 RX ORDER — ONDANSETRON 2 MG/ML
4 INJECTION INTRAMUSCULAR; INTRAVENOUS EVERY 6 HOURS PRN
Status: DISCONTINUED | OUTPATIENT
Start: 2023-04-18 | End: 2023-04-21 | Stop reason: HOSPADM

## 2023-04-18 RX ORDER — VITAMIN B COMPLEX
4000 TABLET ORAL DAILY
Status: DISCONTINUED | OUTPATIENT
Start: 2023-04-18 | End: 2023-04-21 | Stop reason: HOSPADM

## 2023-04-18 RX ORDER — PROPOFOL 10 MG/ML
INJECTION, EMULSION INTRAVENOUS PRN
Status: DISCONTINUED | OUTPATIENT
Start: 2023-04-18 | End: 2023-04-18 | Stop reason: SDUPTHER

## 2023-04-18 RX ORDER — SERTRALINE HYDROCHLORIDE 25 MG/1
25 TABLET, FILM COATED ORAL DAILY
Status: DISCONTINUED | OUTPATIENT
Start: 2023-04-18 | End: 2023-04-21 | Stop reason: HOSPADM

## 2023-04-18 RX ORDER — SODIUM CHLORIDE 0.9 % (FLUSH) 0.9 %
5-40 SYRINGE (ML) INJECTION PRN
Status: DISCONTINUED | OUTPATIENT
Start: 2023-04-18 | End: 2023-04-21 | Stop reason: HOSPADM

## 2023-04-18 RX ORDER — LABETALOL HYDROCHLORIDE 5 MG/ML
10 INJECTION, SOLUTION INTRAVENOUS
Status: DISCONTINUED | OUTPATIENT
Start: 2023-04-18 | End: 2023-04-18 | Stop reason: HOSPADM

## 2023-04-18 RX ORDER — IPRATROPIUM BROMIDE AND ALBUTEROL SULFATE 2.5; .5 MG/3ML; MG/3ML
1 SOLUTION RESPIRATORY (INHALATION)
Status: DISCONTINUED | OUTPATIENT
Start: 2023-04-18 | End: 2023-04-18 | Stop reason: HOSPADM

## 2023-04-18 RX ORDER — ASCORBIC ACID 500 MG
1000 TABLET ORAL DAILY
Status: DISCONTINUED | OUTPATIENT
Start: 2023-04-18 | End: 2023-04-21 | Stop reason: HOSPADM

## 2023-04-18 RX ORDER — ENOXAPARIN SODIUM 100 MG/ML
40 INJECTION SUBCUTANEOUS DAILY
Status: DISCONTINUED | OUTPATIENT
Start: 2023-04-19 | End: 2023-04-21 | Stop reason: HOSPADM

## 2023-04-18 RX ORDER — SODIUM CHLORIDE 0.9 % (FLUSH) 0.9 %
5-40 SYRINGE (ML) INJECTION EVERY 12 HOURS SCHEDULED
Status: DISCONTINUED | OUTPATIENT
Start: 2023-04-18 | End: 2023-04-18 | Stop reason: HOSPADM

## 2023-04-18 RX ORDER — ROCURONIUM BROMIDE 10 MG/ML
INJECTION, SOLUTION INTRAVENOUS PRN
Status: DISCONTINUED | OUTPATIENT
Start: 2023-04-18 | End: 2023-04-18 | Stop reason: SDUPTHER

## 2023-04-18 RX ORDER — SODIUM CHLORIDE 0.9 % (FLUSH) 0.9 %
5-40 SYRINGE (ML) INJECTION EVERY 12 HOURS SCHEDULED
Status: DISCONTINUED | OUTPATIENT
Start: 2023-04-18 | End: 2023-04-21 | Stop reason: HOSPADM

## 2023-04-18 RX ORDER — FENTANYL CITRATE 50 UG/ML
50 INJECTION, SOLUTION INTRAMUSCULAR; INTRAVENOUS EVERY 5 MIN PRN
Status: DISCONTINUED | OUTPATIENT
Start: 2023-04-18 | End: 2023-04-18 | Stop reason: HOSPADM

## 2023-04-18 RX ORDER — SODIUM CHLORIDE 9 MG/ML
INJECTION, SOLUTION INTRAVENOUS PRN
Status: DISCONTINUED | OUTPATIENT
Start: 2023-04-18 | End: 2023-04-21 | Stop reason: HOSPADM

## 2023-04-18 RX ORDER — CARVEDILOL 6.25 MG/1
6.25 TABLET ORAL 2 TIMES DAILY WITH MEALS
Status: DISCONTINUED | OUTPATIENT
Start: 2023-04-18 | End: 2023-04-21 | Stop reason: HOSPADM

## 2023-04-18 RX ORDER — SUCCINYLCHOLINE CHLORIDE 20 MG/ML
INJECTION INTRAMUSCULAR; INTRAVENOUS PRN
Status: DISCONTINUED | OUTPATIENT
Start: 2023-04-18 | End: 2023-04-18 | Stop reason: SDUPTHER

## 2023-04-18 RX ORDER — METOCLOPRAMIDE HYDROCHLORIDE 5 MG/ML
10 INJECTION INTRAMUSCULAR; INTRAVENOUS
Status: DISCONTINUED | OUTPATIENT
Start: 2023-04-18 | End: 2023-04-18 | Stop reason: HOSPADM

## 2023-04-18 RX ORDER — MAGNESIUM HYDROXIDE 1200 MG/15ML
LIQUID ORAL CONTINUOUS PRN
Status: DISCONTINUED | OUTPATIENT
Start: 2023-04-18 | End: 2023-04-18 | Stop reason: HOSPADM

## 2023-04-18 RX ORDER — M-VIT,TX,IRON,MINS/CALC/FOLIC 27MG-0.4MG
1 TABLET ORAL DAILY
Status: DISCONTINUED | OUTPATIENT
Start: 2023-04-18 | End: 2023-04-21 | Stop reason: HOSPADM

## 2023-04-18 RX ORDER — POLYETHYLENE GLYCOL 3350 17 G/17G
17 POWDER, FOR SOLUTION ORAL DAILY PRN
Status: DISCONTINUED | OUTPATIENT
Start: 2023-04-18 | End: 2023-04-19

## 2023-04-18 RX ORDER — SODIUM CHLORIDE 0.9 % (FLUSH) 0.9 %
5-40 SYRINGE (ML) INJECTION PRN
Status: DISCONTINUED | OUTPATIENT
Start: 2023-04-18 | End: 2023-04-18 | Stop reason: HOSPADM

## 2023-04-18 RX ORDER — SODIUM CHLORIDE 9 MG/ML
INJECTION, SOLUTION INTRAVENOUS CONTINUOUS
Status: DISCONTINUED | OUTPATIENT
Start: 2023-04-18 | End: 2023-04-18 | Stop reason: HOSPADM

## 2023-04-18 RX ORDER — ONDANSETRON 2 MG/ML
4 INJECTION INTRAMUSCULAR; INTRAVENOUS
Status: DISCONTINUED | OUTPATIENT
Start: 2023-04-18 | End: 2023-04-18 | Stop reason: HOSPADM

## 2023-04-18 RX ORDER — IBUPROFEN 400 MG/1
400 TABLET ORAL EVERY 6 HOURS PRN
Status: DISCONTINUED | OUTPATIENT
Start: 2023-04-18 | End: 2023-04-21 | Stop reason: HOSPADM

## 2023-04-18 RX ORDER — NALOXONE HYDROCHLORIDE 0.4 MG/ML
INJECTION, SOLUTION INTRAMUSCULAR; INTRAVENOUS; SUBCUTANEOUS PRN
Status: DISCONTINUED | OUTPATIENT
Start: 2023-04-18 | End: 2023-04-19

## 2023-04-18 RX ORDER — SODIUM CHLORIDE, SODIUM LACTATE, POTASSIUM CHLORIDE, CALCIUM CHLORIDE 600; 310; 30; 20 MG/100ML; MG/100ML; MG/100ML; MG/100ML
INJECTION, SOLUTION INTRAVENOUS CONTINUOUS
Status: DISCONTINUED | OUTPATIENT
Start: 2023-04-18 | End: 2023-04-20

## 2023-04-18 RX ORDER — EZETIMIBE 10 MG/1
10 TABLET ORAL DAILY
Status: DISCONTINUED | OUTPATIENT
Start: 2023-04-18 | End: 2023-04-21 | Stop reason: HOSPADM

## 2023-04-18 RX ADMIN — SODIUM CHLORIDE, POTASSIUM CHLORIDE, SODIUM LACTATE AND CALCIUM CHLORIDE: 600; 310; 30; 20 INJECTION, SOLUTION INTRAVENOUS at 11:41

## 2023-04-18 RX ADMIN — HYDROMORPHONE HYDROCHLORIDE 0.25 MG: 1 INJECTION, SOLUTION INTRAMUSCULAR; INTRAVENOUS; SUBCUTANEOUS at 14:39

## 2023-04-18 RX ADMIN — HYDROMORPHONE HYDROCHLORIDE 0.25 MG: 1 INJECTION, SOLUTION INTRAMUSCULAR; INTRAVENOUS; SUBCUTANEOUS at 14:55

## 2023-04-18 RX ADMIN — HYDROMORPHONE HYDROCHLORIDE 0.25 MG: 1 INJECTION, SOLUTION INTRAMUSCULAR; INTRAVENOUS; SUBCUTANEOUS at 14:45

## 2023-04-18 RX ADMIN — PROPOFOL 150 MG: 10 INJECTION, EMULSION INTRAVENOUS at 12:07

## 2023-04-18 RX ADMIN — SUGAMMADEX 200 MG: 100 INJECTION, SOLUTION INTRAVENOUS at 14:03

## 2023-04-18 RX ADMIN — SODIUM CHLORIDE, POTASSIUM CHLORIDE, SODIUM LACTATE AND CALCIUM CHLORIDE: 600; 310; 30; 20 INJECTION, SOLUTION INTRAVENOUS at 16:56

## 2023-04-18 RX ADMIN — HYDRALAZINE HYDROCHLORIDE 5 MG: 20 INJECTION INTRAMUSCULAR; INTRAVENOUS at 21:02

## 2023-04-18 RX ADMIN — CEFAZOLIN 2000 MG: 2 INJECTION, POWDER, FOR SOLUTION INTRAMUSCULAR; INTRAVENOUS at 12:23

## 2023-04-18 RX ADMIN — ATORVASTATIN CALCIUM 80 MG: 80 TABLET, FILM COATED ORAL at 18:40

## 2023-04-18 RX ADMIN — FENTANYL CITRATE 150 MCG: 50 INJECTION, SOLUTION INTRAMUSCULAR; INTRAVENOUS at 12:07

## 2023-04-18 RX ADMIN — FENTANYL CITRATE 50 MCG: 50 INJECTION, SOLUTION INTRAMUSCULAR; INTRAVENOUS at 12:54

## 2023-04-18 RX ADMIN — GLYCOPYRROLATE 0.2 MG: 0.2 INJECTION INTRAMUSCULAR; INTRAVENOUS at 12:37

## 2023-04-18 RX ADMIN — LORAZEPAM 0.5 MG: 2 INJECTION INTRAMUSCULAR; INTRAVENOUS at 15:03

## 2023-04-18 RX ADMIN — SUCCINYLCHOLINE CHLORIDE 120 MG: 20 INJECTION, SOLUTION INTRAMUSCULAR; INTRAVENOUS; PARENTERAL at 12:08

## 2023-04-18 RX ADMIN — DEXAMETHASONE SODIUM PHOSPHATE 4 MG: 4 INJECTION, SOLUTION INTRAMUSCULAR; INTRAVENOUS at 12:20

## 2023-04-18 RX ADMIN — FENTANYL CITRATE 50 MCG: 50 INJECTION, SOLUTION INTRAMUSCULAR; INTRAVENOUS at 12:43

## 2023-04-18 RX ADMIN — HYDROMORPHONE HYDROCHLORIDE 0.25 MG: 1 INJECTION, SOLUTION INTRAMUSCULAR; INTRAVENOUS; SUBCUTANEOUS at 14:50

## 2023-04-18 RX ADMIN — CARVEDILOL 6.25 MG: 6.25 TABLET, FILM COATED ORAL at 18:40

## 2023-04-18 RX ADMIN — ACETAMINOPHEN 1000 MG: 500 TABLET ORAL at 18:40

## 2023-04-18 RX ADMIN — ROCURONIUM BROMIDE 30 MG: 10 INJECTION INTRAVENOUS at 12:50

## 2023-04-18 RX ADMIN — LIDOCAINE HYDROCHLORIDE 50 MG: 10 INJECTION, SOLUTION EPIDURAL; INFILTRATION; INTRACAUDAL; PERINEURAL at 12:07

## 2023-04-18 RX ADMIN — ROCURONIUM BROMIDE 50 MG: 10 INJECTION INTRAVENOUS at 12:14

## 2023-04-18 RX ADMIN — HYDROMORPHONE HYDROCHLORIDE 0.5 MG: 1 INJECTION, SOLUTION INTRAMUSCULAR; INTRAVENOUS; SUBCUTANEOUS at 15:02

## 2023-04-18 RX ADMIN — Medication: at 15:15

## 2023-04-18 ASSESSMENT — PAIN DESCRIPTION - LOCATION
LOCATION: CHEST

## 2023-04-18 ASSESSMENT — PAIN DESCRIPTION - ORIENTATION
ORIENTATION: RIGHT

## 2023-04-18 ASSESSMENT — PAIN SCALES - WONG BAKER
WONGBAKER_NUMERICALRESPONSE: 2
WONGBAKER_NUMERICALRESPONSE: 6
WONGBAKER_NUMERICALRESPONSE: 4
WONGBAKER_NUMERICALRESPONSE: 4
WONGBAKER_NUMERICALRESPONSE: 2
WONGBAKER_NUMERICALRESPONSE: 6

## 2023-04-18 ASSESSMENT — PAIN DESCRIPTION - PAIN TYPE
TYPE: SURGICAL PAIN

## 2023-04-18 ASSESSMENT — PAIN DESCRIPTION - FREQUENCY
FREQUENCY: CONTINUOUS

## 2023-04-18 ASSESSMENT — PAIN - FUNCTIONAL ASSESSMENT
PAIN_FUNCTIONAL_ASSESSMENT: PREVENTS OR INTERFERES SOME ACTIVE ACTIVITIES AND ADLS
PAIN_FUNCTIONAL_ASSESSMENT: 0-10
PAIN_FUNCTIONAL_ASSESSMENT: PREVENTS OR INTERFERES SOME ACTIVE ACTIVITIES AND ADLS

## 2023-04-18 ASSESSMENT — PAIN DESCRIPTION - ONSET
ONSET: ON-GOING
ONSET: GRADUAL
ONSET: ON-GOING

## 2023-04-18 ASSESSMENT — PAIN SCALES - GENERAL
PAINLEVEL_OUTOF10: 3
PAINLEVEL_OUTOF10: 10
PAINLEVEL_OUTOF10: 6
PAINLEVEL_OUTOF10: 3
PAINLEVEL_OUTOF10: 5
PAINLEVEL_OUTOF10: 6
PAINLEVEL_OUTOF10: 4
PAINLEVEL_OUTOF10: 3
PAINLEVEL_OUTOF10: 6
PAINLEVEL_OUTOF10: 6
PAINLEVEL_OUTOF10: 5
PAINLEVEL_OUTOF10: 10

## 2023-04-18 ASSESSMENT — PAIN DESCRIPTION - DESCRIPTORS
DESCRIPTORS: ACHING;SORE
DESCRIPTORS: ACHING;SORE
DESCRIPTORS: ACHING
DESCRIPTORS: ACHING
DESCRIPTORS: ACHING;SORE
DESCRIPTORS: ACHING;SORE
DESCRIPTORS: SORE
DESCRIPTORS: ACHING;SORE
DESCRIPTORS: SORE
DESCRIPTORS: SORE
DESCRIPTORS: ACHING

## 2023-04-18 ASSESSMENT — LIFESTYLE VARIABLES: SMOKING_STATUS: 0

## 2023-04-18 NOTE — BRIEF OP NOTE
Pre-op Dx: enlarging PET+ GGO in RLL; hx tongue cancer; prior CABG; CAD      Post-op Dx:same      Procedure:R VATS; mini thoracotomy via 4th and 6th ICS with resection of RLL; lysis of extensive pulmonary adhesions; ICNB x 5 levels (4 through 8)      Anesthesia:General endotracheal anesthesia      Surgeon/Assistants:Lit      Specimens:RLL with contained station 11R and  12R lymph nodes      EBL:< 20 cc's      Complications: none      Findings:almost fully incomplete fissure between RML and RLL; no 4R and10R nodes identifiable; GGO not palpable within lung parenchyma

## 2023-04-18 NOTE — ANESTHESIA PRE PROCEDURE
 Hyperlipidemia     Hyperpar[facility]roidism ([facility] 2014    Dr. Jimmy Jenkins Kidney cysts     Liver cyst     Migraine     Neuropathy     hands and feet    Nosebleed     SHABBIR (obstructive sleep apnea)     Uses CPAP    Parathyroid adenoma     Prostate atrophy     Pulmonary nodule     Sleep apnea     Squamous cell carcinoma of base of tongue ([facility]     s/p chemo, XRT    Tinnitus     Wears glasses        Past Surgical History:        Procedure Laterality Date    APPENDECTOMY      CARDIAC SURGERY      COLONOSCOPY  Aug., 2004 (  )    Dr. Dina Michaud - normal     COLONOSCOPY  Mar., 2015 (  )    Dr. Laurel Diaz - Marvie So  10/2004    HARVEY-LAD, Radial \"T\" to 1st Yolanda Chino  *2020    Dr. Jez Byers - EGD normal    LARYNGOSCOPY  *May 19, 2020    Dr. Donita Reddy - biopsy of right base of tongue mass    OTHER SURGICAL HISTORY N/A 2018    CYSTOSCOPY, URETHRAL DILATION, TRANSURETHRAL RESECTION PROSTATE    PARATHYROIDECTOMY  2014    Dr. Rula Nelson - excision of a right upper parathyroid adenoma    PORTACATH PLACEMENT Right     right chest    PROSTATE BIOPSY      benign    PROSTATE BIOPSY  2011    Dr. Chicho Johnston - benign - PSA 11.5    ROTATOR CUFF REPAIR Right 1998    STOMACH SURGERY N/A 2020    REMOVAL PEG TUBE performed by Solomon Chandra MD at [address] [facility]  *2018    Dr. Chicho Johnston - BPH       Social History:    Social History     Tobacco Use    Smoking status: Former     Types: Pipe     Quit date:      Years since quittin.3    Smokeless tobacco: Former     Types: Chew    Tobacco comments:     Unsure when I stopped   Substance Use Topics    Alcohol use: Yes     Comment: 1-2 ounces daily                                Counseling given: Not Answered  Tobacco comments: Unsure when I stopped      Vital Signs (Current): There were no vitals filed for this visit.

## 2023-04-18 NOTE — ANESTHESIA POSTPROCEDURE EVALUATION
Department of Anesthesiology  Postprocedure Note    Patient: Marcy Ruth  MRN: [id number]  YOB: 1945  Date of evaluation: 4/18/2023      Procedure Summary     Date: 04/18/23 Room / Location: [address]    Anesthesia Start: 0466 Anesthesia Stop: 4383    Procedure: R VATS; mini thoracotomy via 4th and 6th ICS with resection of RLL; lysis of extensive pulmonary adhesions; ICNB x 5 levels (4 through 8) (Right: Chest) Diagnosis:       Nodule of lower lobe of right lung      (Nodule of lower lobe of right lung [R91.1])    Surgeons: Mike Milton MD Responsible Provider: Karsten Aragon MD    Anesthesia Type: general ASA Status: 3          Anesthesia Type: No value filed.     Matthew Phase I: Matthew Score: 8    Matthew Phase II:        Anesthesia Post Evaluation    Patient location during evaluation: PACU  Level of consciousness: awake  Complications: no  Multimodal analgesia pain management approach

## 2023-04-18 NOTE — H&P
Update History & Physical    The patient's History and Physical of April 11, 2023 was reviewed with the patient and I examined the patient. There was no change. The surgical site was confirmed by the patient and me. Plan: The risks, benefits, expected outcome, and alternative to the recommended procedure have been discussed with the patient. Patient understands and wants to proceed with the procedure.      Electronically signed by Linn Reeves MD on 4/18/2023 at 12:08 PM

## 2023-04-19 ENCOUNTER — APPOINTMENT (OUTPATIENT)
Dept: GENERAL RADIOLOGY | Age: 78
DRG: 204 | End: 2023-04-19
Attending: THORACIC SURGERY (CARDIOTHORACIC VASCULAR SURGERY)
Payer: MEDICARE

## 2023-04-19 LAB
BASOPHILS # BLD: 0 K/UL (ref 0–0.2)
BASOPHILS # BLD: 0 K/UL (ref 0–0.2)
BASOPHILS NFR BLD: 0.2 %
BASOPHILS NFR BLD: 0.4 %
DEPRECATED RDW RBC AUTO: 14 % (ref 12.4–15.4)
DEPRECATED RDW RBC AUTO: 14.3 % (ref 12.4–15.4)
EOSINOPHIL # BLD: 0 K/UL (ref 0–0.6)
EOSINOPHIL # BLD: 0 K/UL (ref 0–0.6)
EOSINOPHIL NFR BLD: 0.1 %
EOSINOPHIL NFR BLD: 0.1 %
HCT VFR BLD AUTO: 35.2 % (ref 40.5–52.5)
HCT VFR BLD AUTO: 36 % (ref 40.5–52.5)
HGB BLD-MCNC: 12 G/DL (ref 13.5–17.5)
HGB BLD-MCNC: 12.2 G/DL (ref 13.5–17.5)
LYMPHOCYTES # BLD: 0.4 K/UL (ref 1–5.1)
LYMPHOCYTES # BLD: 0.4 K/UL (ref 1–5.1)
LYMPHOCYTES NFR BLD: 3.5 %
LYMPHOCYTES NFR BLD: 3.8 %
MCH RBC QN AUTO: 33 PG (ref 26–34)
MCH RBC QN AUTO: 33.4 PG (ref 26–34)
MCHC RBC AUTO-ENTMCNC: 33.9 G/DL (ref 31–36)
MCHC RBC AUTO-ENTMCNC: 34.2 G/DL (ref 31–36)
MCV RBC AUTO: 97.3 FL (ref 80–100)
MCV RBC AUTO: 97.8 FL (ref 80–100)
MONOCYTES # BLD: 0.8 K/UL (ref 0–1.3)
MONOCYTES # BLD: 0.8 K/UL (ref 0–1.3)
MONOCYTES NFR BLD: 7.8 %
MONOCYTES NFR BLD: 7.9 %
NEUTROPHILS # BLD: 9.1 K/UL (ref 1.7–7.7)
NEUTROPHILS # BLD: 9.4 K/UL (ref 1.7–7.7)
NEUTROPHILS NFR BLD: 88.1 %
NEUTROPHILS NFR BLD: 88.1 %
PLATELET # BLD AUTO: 196 K/UL (ref 135–450)
PLATELET # BLD AUTO: 202 K/UL (ref 135–450)
PMV BLD AUTO: 7.6 FL (ref 5–10.5)
PMV BLD AUTO: 7.7 FL (ref 5–10.5)
RBC # BLD AUTO: 3.6 M/UL (ref 4.2–5.9)
RBC # BLD AUTO: 3.7 M/UL (ref 4.2–5.9)
WBC # BLD AUTO: 10.3 K/UL (ref 4–11)
WBC # BLD AUTO: 10.7 K/UL (ref 4–11)

## 2023-04-19 PROCEDURE — 2580000003 HC RX 258: Performed by: SURGERY

## 2023-04-19 PROCEDURE — 2700000000 HC OXYGEN THERAPY PER DAY

## 2023-04-19 PROCEDURE — 6370000000 HC RX 637 (ALT 250 FOR IP): Performed by: SURGERY

## 2023-04-19 PROCEDURE — 85025 COMPLETE CBC W/AUTO DIFF WBC: CPT

## 2023-04-19 PROCEDURE — 94761 N-INVAS EAR/PLS OXIMETRY MLT: CPT

## 2023-04-19 PROCEDURE — 36415 COLL VENOUS BLD VENIPUNCTURE: CPT

## 2023-04-19 PROCEDURE — 6370000000 HC RX 637 (ALT 250 FOR IP): Performed by: STUDENT IN AN ORGANIZED HEALTH CARE EDUCATION/TRAINING PROGRAM

## 2023-04-19 PROCEDURE — 1200000000 HC SEMI PRIVATE

## 2023-04-19 PROCEDURE — 71045 X-RAY EXAM CHEST 1 VIEW: CPT

## 2023-04-19 PROCEDURE — 6360000002 HC RX W HCPCS: Performed by: SURGERY

## 2023-04-19 PROCEDURE — 94660 CPAP INITIATION&MGMT: CPT

## 2023-04-19 RX ORDER — METHOCARBAMOL 500 MG/1
1000 TABLET, FILM COATED ORAL 4 TIMES DAILY
Status: DISCONTINUED | OUTPATIENT
Start: 2023-04-19 | End: 2023-04-21 | Stop reason: HOSPADM

## 2023-04-19 RX ADMIN — ACETAMINOPHEN 1000 MG: 500 TABLET ORAL at 00:04

## 2023-04-19 RX ADMIN — SODIUM CHLORIDE, POTASSIUM CHLORIDE, SODIUM LACTATE AND CALCIUM CHLORIDE: 600; 310; 30; 20 INJECTION, SOLUTION INTRAVENOUS at 22:20

## 2023-04-19 RX ADMIN — ENOXAPARIN SODIUM 40 MG: 100 INJECTION SUBCUTANEOUS at 08:27

## 2023-04-19 RX ADMIN — METHOCARBAMOL 1000 MG: 500 TABLET ORAL at 12:17

## 2023-04-19 RX ADMIN — AMLODIPINE BESYLATE 5 MG: 5 TABLET ORAL at 08:28

## 2023-04-19 RX ADMIN — ASPIRIN 325 MG: 325 TABLET ORAL at 08:27

## 2023-04-19 RX ADMIN — SODIUM CHLORIDE, PRESERVATIVE FREE 10 ML: 5 INJECTION INTRAVENOUS at 08:28

## 2023-04-19 RX ADMIN — ACETAMINOPHEN 1000 MG: 500 TABLET ORAL at 23:43

## 2023-04-19 RX ADMIN — CARVEDILOL 6.25 MG: 6.25 TABLET, FILM COATED ORAL at 08:28

## 2023-04-19 RX ADMIN — SERTRALINE HYDROCHLORIDE 25 MG: 25 TABLET ORAL at 08:27

## 2023-04-19 RX ADMIN — ACETAMINOPHEN 1000 MG: 500 TABLET ORAL at 06:17

## 2023-04-19 RX ADMIN — MULTIPLE VITAMINS W/ MINERALS TAB 1 TABLET: TAB at 08:27

## 2023-04-19 RX ADMIN — ACETAMINOPHEN 1000 MG: 500 TABLET ORAL at 12:17

## 2023-04-19 RX ADMIN — SODIUM CHLORIDE, PRESERVATIVE FREE 10 ML: 5 INJECTION INTRAVENOUS at 19:44

## 2023-04-19 RX ADMIN — METHOCARBAMOL 1000 MG: 500 TABLET ORAL at 19:43

## 2023-04-19 RX ADMIN — OXYCODONE HYDROCHLORIDE AND ACETAMINOPHEN 1000 MG: 500 TABLET ORAL at 08:27

## 2023-04-19 RX ADMIN — ATORVASTATIN CALCIUM 80 MG: 80 TABLET, FILM COATED ORAL at 08:27

## 2023-04-19 RX ADMIN — OXYCODONE 5 MG: 5 TABLET ORAL at 21:18

## 2023-04-19 RX ADMIN — EZETIMIBE 10 MG: 10 TABLET ORAL at 08:27

## 2023-04-19 RX ADMIN — METHOCARBAMOL 1000 MG: 500 TABLET ORAL at 17:09

## 2023-04-19 RX ADMIN — Medication 4000 UNITS: at 08:28

## 2023-04-19 ASSESSMENT — PAIN DESCRIPTION - DESCRIPTORS
DESCRIPTORS: ACHING;DISCOMFORT
DESCRIPTORS: SORE
DESCRIPTORS: SORE
DESCRIPTORS: ACHING

## 2023-04-19 ASSESSMENT — PAIN DESCRIPTION - ONSET
ONSET: ON-GOING
ONSET: GRADUAL
ONSET: ON-GOING

## 2023-04-19 ASSESSMENT — PAIN DESCRIPTION - FREQUENCY
FREQUENCY: CONTINUOUS

## 2023-04-19 ASSESSMENT — PAIN DESCRIPTION - ORIENTATION
ORIENTATION: RIGHT

## 2023-04-19 ASSESSMENT — PAIN SCALES - GENERAL
PAINLEVEL_OUTOF10: 6
PAINLEVEL_OUTOF10: 0
PAINLEVEL_OUTOF10: 5
PAINLEVEL_OUTOF10: 10
PAINLEVEL_OUTOF10: 1
PAINLEVEL_OUTOF10: 0
PAINLEVEL_OUTOF10: 6
PAINLEVEL_OUTOF10: 7

## 2023-04-19 ASSESSMENT — PAIN - FUNCTIONAL ASSESSMENT
PAIN_FUNCTIONAL_ASSESSMENT: PREVENTS OR INTERFERES SOME ACTIVE ACTIVITIES AND ADLS

## 2023-04-19 ASSESSMENT — PAIN DESCRIPTION - LOCATION
LOCATION: RIB CAGE
LOCATION: CHEST
LOCATION: RIB CAGE
LOCATION: CHEST

## 2023-04-19 ASSESSMENT — PAIN DESCRIPTION - PAIN TYPE
TYPE: SURGICAL PAIN

## 2023-04-19 NOTE — CARE COORDINATION
DME:    Patient expects to discharge to: House  Plan for transportation at discharge:      Financial    Payor: Marla Moreno / Plan: MEDICARE PART A AND B / Product Type: *No Product type* /     Does insurance require precert for SNF: No    Potential assistance Purchasing Medications:    Meds-to-Beds request:        [facility] [id number] - [address] [place] [address] - [address] [facility] [address]  [address] [facility] [address]  Phone: [phone] Fax: [phone]    [facility] [phone] - F [phone]  [address] [id number]  Phone: [phone] Fax: [phone]    [facility] - [place] [phone] - [place] [address]  Phone: [phone] Fax: Morena Cespedes [address] [place] [facility] [address]  [address]  [address] [facility] [address]  Phone: [phone] Fax: [address] [address] [facility] [address] [facility] [address]  [address] [facility] [address]  Phone: [phone] Fax: [phone]      Notes:    Factors facilitating achievement of predicted outcomes: Family support, Motivated, Cooperative, Pleasant, and Sense of humor    Barriers to discharge: Medical complications, No insurance coverage, Wound Care, and Medication managment    Additional Case Management Notes: CM met with patient at bedside. Pt is Independent, lives with wife, uses no DME or HHC, active . No CM needs anticipated. Wife to transport. The Plan for Transition of Care is related to the following treatment goals of Nodule of lower lobe of right lung [R91.1]  Right lower lobe lung mass [R68.8]    IF APPLICABLE: The Patient and/or patient representative Venkat Burr and his family were provided with a choice of provider and agrees with the discharge plan.  [place]

## 2023-04-19 NOTE — SIGNIFICANT EVENT
SIGNIFICANT EVENT:    Rapid Response called at 1408, for bed 4307. At time of presentation patient was awake and patient vitals at time of examination were HR 65, /62, SpO2 97%, Temp 99.5. Per nursing, pt's chest tube became dislodged with immediate output of amanda red blood. Surgery team quickly to bedside to control bleeding and assess chest tube. SpO2, RR stable during initial assessment. ICU team available for assistance as needed.      Jocelyne Berger MD, PGY-1  04/19/23  2:10 PM

## 2023-04-20 ENCOUNTER — APPOINTMENT (OUTPATIENT)
Dept: GENERAL RADIOLOGY | Age: 78
DRG: 204 | End: 2023-04-20
Attending: THORACIC SURGERY (CARDIOTHORACIC VASCULAR SURGERY)
Payer: MEDICARE

## 2023-04-20 PROCEDURE — 71045 X-RAY EXAM CHEST 1 VIEW: CPT

## 2023-04-20 PROCEDURE — 1200000000 HC SEMI PRIVATE

## 2023-04-20 PROCEDURE — 6360000002 HC RX W HCPCS: Performed by: SURGERY

## 2023-04-20 PROCEDURE — 6370000000 HC RX 637 (ALT 250 FOR IP): Performed by: STUDENT IN AN ORGANIZED HEALTH CARE EDUCATION/TRAINING PROGRAM

## 2023-04-20 PROCEDURE — 6370000000 HC RX 637 (ALT 250 FOR IP): Performed by: SURGERY

## 2023-04-20 PROCEDURE — 2580000003 HC RX 258: Performed by: SURGERY

## 2023-04-20 PROCEDURE — 6370000000 HC RX 637 (ALT 250 FOR IP)

## 2023-04-20 RX ORDER — OXYCODONE HYDROCHLORIDE 5 MG/1
5 TABLET ORAL EVERY 6 HOURS PRN
Qty: 20 TABLET | Refills: 0 | Status: SHIPPED | OUTPATIENT
Start: 2023-04-20 | End: 2023-04-21 | Stop reason: SDUPTHER

## 2023-04-20 RX ORDER — DOCUSATE SODIUM 100 MG/1
100 CAPSULE, LIQUID FILLED ORAL 2 TIMES DAILY
Qty: 28 CAPSULE | Refills: 0 | Status: SHIPPED | OUTPATIENT
Start: 2023-04-20 | End: 2023-04-21 | Stop reason: SDUPTHER

## 2023-04-20 RX ADMIN — ACETAMINOPHEN 1000 MG: 500 TABLET ORAL at 06:28

## 2023-04-20 RX ADMIN — EZETIMIBE 10 MG: 10 TABLET ORAL at 08:19

## 2023-04-20 RX ADMIN — SODIUM CHLORIDE, PRESERVATIVE FREE 10 ML: 5 INJECTION INTRAVENOUS at 19:49

## 2023-04-20 RX ADMIN — METHOCARBAMOL 1000 MG: 500 TABLET ORAL at 11:46

## 2023-04-20 RX ADMIN — OXYCODONE HYDROCHLORIDE AND ACETAMINOPHEN 1000 MG: 500 TABLET ORAL at 08:19

## 2023-04-20 RX ADMIN — AMLODIPINE BESYLATE 5 MG: 5 TABLET ORAL at 08:19

## 2023-04-20 RX ADMIN — ATORVASTATIN CALCIUM 80 MG: 80 TABLET, FILM COATED ORAL at 08:19

## 2023-04-20 RX ADMIN — METHOCARBAMOL 1000 MG: 500 TABLET ORAL at 17:13

## 2023-04-20 RX ADMIN — OXYCODONE 5 MG: 5 TABLET ORAL at 19:48

## 2023-04-20 RX ADMIN — SERTRALINE HYDROCHLORIDE 25 MG: 25 TABLET ORAL at 08:19

## 2023-04-20 RX ADMIN — CARVEDILOL 6.25 MG: 6.25 TABLET, FILM COATED ORAL at 08:19

## 2023-04-20 RX ADMIN — CARVEDILOL 6.25 MG: 6.25 TABLET, FILM COATED ORAL at 17:13

## 2023-04-20 RX ADMIN — ACETAMINOPHEN 1000 MG: 500 TABLET ORAL at 17:13

## 2023-04-20 RX ADMIN — MULTIPLE VITAMINS W/ MINERALS TAB 1 TABLET: TAB at 08:19

## 2023-04-20 RX ADMIN — SODIUM CHLORIDE, PRESERVATIVE FREE 10 ML: 5 INJECTION INTRAVENOUS at 08:20

## 2023-04-20 RX ADMIN — ACETAMINOPHEN 1000 MG: 500 TABLET ORAL at 11:46

## 2023-04-20 RX ADMIN — ENOXAPARIN SODIUM 40 MG: 100 INJECTION SUBCUTANEOUS at 08:19

## 2023-04-20 RX ADMIN — ASPIRIN 325 MG: 325 TABLET ORAL at 08:23

## 2023-04-20 RX ADMIN — METHOCARBAMOL 1000 MG: 500 TABLET ORAL at 08:19

## 2023-04-20 RX ADMIN — Medication 4000 UNITS: at 08:19

## 2023-04-20 RX ADMIN — METHOCARBAMOL 1000 MG: 500 TABLET ORAL at 19:48

## 2023-04-20 ASSESSMENT — PAIN SCALES - GENERAL
PAINLEVEL_OUTOF10: 0
PAINLEVEL_OUTOF10: 7
PAINLEVEL_OUTOF10: 0
PAINLEVEL_OUTOF10: 4
PAINLEVEL_OUTOF10: 5
PAINLEVEL_OUTOF10: 0
PAINLEVEL_OUTOF10: 5
PAINLEVEL_OUTOF10: 0
PAINLEVEL_OUTOF10: 7

## 2023-04-20 ASSESSMENT — PAIN DESCRIPTION - PAIN TYPE
TYPE: SURGICAL PAIN
TYPE: SURGICAL PAIN

## 2023-04-20 ASSESSMENT — PAIN DESCRIPTION - DESCRIPTORS
DESCRIPTORS: ACHING
DESCRIPTORS: ACHING

## 2023-04-20 ASSESSMENT — PAIN - FUNCTIONAL ASSESSMENT
PAIN_FUNCTIONAL_ASSESSMENT: PREVENTS OR INTERFERES SOME ACTIVE ACTIVITIES AND ADLS
PAIN_FUNCTIONAL_ASSESSMENT: PREVENTS OR INTERFERES SOME ACTIVE ACTIVITIES AND ADLS

## 2023-04-20 ASSESSMENT — PAIN DESCRIPTION - LOCATION
LOCATION: CHEST
LOCATION: CHEST

## 2023-04-20 ASSESSMENT — PAIN DESCRIPTION - FREQUENCY
FREQUENCY: CONTINUOUS
FREQUENCY: CONTINUOUS

## 2023-04-20 ASSESSMENT — PAIN DESCRIPTION - ORIENTATION
ORIENTATION: RIGHT
ORIENTATION: RIGHT

## 2023-04-20 ASSESSMENT — PAIN DESCRIPTION - ONSET
ONSET: ON-GOING
ONSET: ON-GOING

## 2023-04-20 NOTE — DISCHARGE INSTRUCTIONS
Discharge Instructions:    Diet:   You may resume a regular diet. Wound Care: You may take the dressing off on 4/24/23    Activity:   No heavy lifting greater than a milk jug until follow up. Pain management:   Unless informed of any restrictions by your primary care physician, please use your preferred over-the-counter pain reliever as your primary pain medication. If you have pain that persists despite over-the-counter pain medications, you have been provided with a prescription for an opioid/narcotic pain reliever (Roxicodone). No driving or operating machinery while taking opioid/narcotic medications. Bowel Regimen:   Opioid/Narcotic pain relievers have a common side effect of constipation; therefore, you have been provided with a prescription for a stool softener, Docusate (Colace). These medications are intended to help prevent you from experiencing this very common side effect and also help to regulate your bowels after surgery. If your stools become too loose and/or frequent, decrease the Colace to one pill one time each day. If your stools are still loose after this modification, stop taking this medication all together. Return Precautions:   Call/ Return to ED for shortness of breath, new or worsening chest pain or any other acute changes. Follow up with Dr. Roel Lipscomb in 1-2 weeks. Please call [phone] to schedule your appointment.

## 2023-04-21 VITALS
SYSTOLIC BLOOD PRESSURE: 102 MMHG | TEMPERATURE: 98.2 F | OXYGEN SATURATION: 93 % | BODY MASS INDEX: 26.68 KG/M2 | HEIGHT: 72 IN | WEIGHT: 197 LBS | RESPIRATION RATE: 18 BRPM | DIASTOLIC BLOOD PRESSURE: 52 MMHG | HEART RATE: 65 BPM

## 2023-04-21 LAB
ALBUMIN SERPL-MCNC: 3.1 G/DL (ref 3.4–5)
ANION GAP SERPL CALCULATED.3IONS-SCNC: 5 MMOL/L (ref 3–16)
BUN SERPL-MCNC: 21 MG/DL (ref 7–20)
CALCIUM SERPL-MCNC: 9.2 MG/DL (ref 8.3–10.6)
CHLORIDE SERPL-SCNC: 101 MMOL/L (ref 99–110)
CO2 SERPL-SCNC: 31 MMOL/L (ref 21–32)
CREAT SERPL-MCNC: 1.1 MG/DL (ref 0.8–1.3)
DEPRECATED RDW RBC AUTO: 13.9 % (ref 12.4–15.4)
GFR SERPLBLD CREATININE-BSD FMLA CKD-EPI: >60 ML/MIN/{1.73_M2}
GLUCOSE SERPL-MCNC: 147 MG/DL (ref 70–99)
HCT VFR BLD AUTO: 31.7 % (ref 40.5–52.5)
HGB BLD-MCNC: 10.8 G/DL (ref 13.5–17.5)
MAGNESIUM SERPL-MCNC: 1.9 MG/DL (ref 1.8–2.4)
MCH RBC QN AUTO: 33 PG (ref 26–34)
MCHC RBC AUTO-ENTMCNC: 34.1 G/DL (ref 31–36)
MCV RBC AUTO: 96.7 FL (ref 80–100)
PHOSPHATE SERPL-MCNC: 2.8 MG/DL (ref 2.5–4.9)
PLATELET # BLD AUTO: 203 K/UL (ref 135–450)
PMV BLD AUTO: 7.9 FL (ref 5–10.5)
POTASSIUM SERPL-SCNC: 4.5 MMOL/L (ref 3.5–5.1)
RBC # BLD AUTO: 3.28 M/UL (ref 4.2–5.9)
SODIUM SERPL-SCNC: 137 MMOL/L (ref 136–145)
WBC # BLD AUTO: 8.3 K/UL (ref 4–11)

## 2023-04-21 PROCEDURE — 3E0T3BZ INTRODUCTION OF ANESTHETIC AGENT INTO PERIPHERAL NERVES AND PLEXI, PERCUTANEOUS APPROACH: ICD-10-PCS | Performed by: THORACIC SURGERY (CARDIOTHORACIC VASCULAR SURGERY)

## 2023-04-21 PROCEDURE — 6370000000 HC RX 637 (ALT 250 FOR IP): Performed by: SURGERY

## 2023-04-21 PROCEDURE — 6370000000 HC RX 637 (ALT 250 FOR IP)

## 2023-04-21 PROCEDURE — 94761 N-INVAS EAR/PLS OXIMETRY MLT: CPT

## 2023-04-21 PROCEDURE — 0BTF0ZZ RESECTION OF RIGHT LOWER LUNG LOBE, OPEN APPROACH: ICD-10-PCS | Performed by: THORACIC SURGERY (CARDIOTHORACIC VASCULAR SURGERY)

## 2023-04-21 PROCEDURE — 85027 COMPLETE CBC AUTOMATED: CPT

## 2023-04-21 PROCEDURE — 0BNK0ZZ RELEASE RIGHT LUNG, OPEN APPROACH: ICD-10-PCS | Performed by: THORACIC SURGERY (CARDIOTHORACIC VASCULAR SURGERY)

## 2023-04-21 PROCEDURE — 36415 COLL VENOUS BLD VENIPUNCTURE: CPT

## 2023-04-21 PROCEDURE — 6370000000 HC RX 637 (ALT 250 FOR IP): Performed by: STUDENT IN AN ORGANIZED HEALTH CARE EDUCATION/TRAINING PROGRAM

## 2023-04-21 PROCEDURE — 80069 RENAL FUNCTION PANEL: CPT

## 2023-04-21 PROCEDURE — 6360000002 HC RX W HCPCS: Performed by: SURGERY

## 2023-04-21 PROCEDURE — 2580000003 HC RX 258: Performed by: SURGERY

## 2023-04-21 PROCEDURE — 83735 ASSAY OF MAGNESIUM: CPT

## 2023-04-21 RX ORDER — DOCUSATE SODIUM 100 MG/1
100 CAPSULE, LIQUID FILLED ORAL 2 TIMES DAILY
Qty: 28 CAPSULE | Refills: 0 | Status: SHIPPED | OUTPATIENT
Start: 2023-04-21 | End: 2023-05-03 | Stop reason: ALTCHOICE

## 2023-04-21 RX ORDER — OXYCODONE HYDROCHLORIDE 5 MG/1
5 TABLET ORAL EVERY 6 HOURS PRN
Qty: 20 TABLET | Refills: 0 | Status: SHIPPED | OUTPATIENT
Start: 2023-04-21 | End: 2023-04-26

## 2023-04-21 RX ADMIN — Medication 4000 UNITS: at 08:26

## 2023-04-21 RX ADMIN — ACETAMINOPHEN 1000 MG: 500 TABLET ORAL at 11:47

## 2023-04-21 RX ADMIN — EZETIMIBE 10 MG: 10 TABLET ORAL at 08:26

## 2023-04-21 RX ADMIN — ACETAMINOPHEN 1000 MG: 500 TABLET ORAL at 08:32

## 2023-04-21 RX ADMIN — ATORVASTATIN CALCIUM 80 MG: 80 TABLET, FILM COATED ORAL at 08:26

## 2023-04-21 RX ADMIN — AMLODIPINE BESYLATE 5 MG: 5 TABLET ORAL at 08:26

## 2023-04-21 RX ADMIN — OXYCODONE 5 MG: 5 TABLET ORAL at 08:27

## 2023-04-21 RX ADMIN — MULTIPLE VITAMINS W/ MINERALS TAB 1 TABLET: TAB at 08:26

## 2023-04-21 RX ADMIN — ASPIRIN 325 MG: 325 TABLET ORAL at 08:27

## 2023-04-21 RX ADMIN — SODIUM CHLORIDE, PRESERVATIVE FREE 10 ML: 5 INJECTION INTRAVENOUS at 08:27

## 2023-04-21 RX ADMIN — OXYCODONE HYDROCHLORIDE AND ACETAMINOPHEN 1000 MG: 500 TABLET ORAL at 08:26

## 2023-04-21 RX ADMIN — ACETAMINOPHEN 1000 MG: 500 TABLET ORAL at 00:19

## 2023-04-21 RX ADMIN — METHOCARBAMOL 1000 MG: 500 TABLET ORAL at 08:27

## 2023-04-21 RX ADMIN — CARVEDILOL 6.25 MG: 6.25 TABLET, FILM COATED ORAL at 08:26

## 2023-04-21 RX ADMIN — SERTRALINE HYDROCHLORIDE 25 MG: 25 TABLET ORAL at 08:27

## 2023-04-21 RX ADMIN — METHOCARBAMOL 1000 MG: 500 TABLET ORAL at 11:47

## 2023-04-21 RX ADMIN — ENOXAPARIN SODIUM 40 MG: 100 INJECTION SUBCUTANEOUS at 08:26

## 2023-04-21 ASSESSMENT — PAIN DESCRIPTION - ORIENTATION: ORIENTATION: RIGHT

## 2023-04-21 ASSESSMENT — PAIN DESCRIPTION - LOCATION: LOCATION: CHEST

## 2023-04-21 ASSESSMENT — PAIN DESCRIPTION - DESCRIPTORS: DESCRIPTORS: ACHING

## 2023-04-21 ASSESSMENT — PAIN SCALES - GENERAL
PAINLEVEL_OUTOF10: 0
PAINLEVEL_OUTOF10: 0
PAINLEVEL_OUTOF10: 5
PAINLEVEL_OUTOF10: 8

## 2023-04-21 NOTE — CARE COORDINATION
drug card and patient face sheet must be sent along with the prescription(s)  4. Cost of Rx cannot be added to hospital bill. If financial assistance is needed, please contact unit  or ;  or  CANNOT provide pharmacy voucher for patients co-pays  5. Patients can then  the prescription on their way out of the hospital at discharge, or pharmacy can deliver to the bedside if staff is available. (payment due at time of pick-up or delivery - cash, check, or card accepted)     Able to afford home medications/ co-pay costs: Yes    ADLS:  Current PT AM-PAC Score:   /24  Current OT AM-PAC Score:   /24      DISCHARGE Disposition: Home- No Services Needed    LOC at discharge: Not Applicable  MOISES Completed: No, Not Indicated    Notification completed in HENS/PAS?:  Not Applicable    IMM Completed:   Yes, Case management has presented and reviewed IMM letter #2 to the patient and/or family/ POA. Patient and/or family/POA verbalized understanding of their medicare rights and appeal process if needed. Patient and/or family/POA has signed and placed today's date (4.21.23) and time (1312) on letter #2 on the the appropriate lines. Patient and/or family/POA, provided copy of signed letter and they are aware that this original copy of IMM letter #2 is available prior to discharge from the paper chart on the unit. Electronic documentation has been entered into epic for IMM letter #2 and original paper copy has been added to the paper chart at the nurses station.      IMM Letter given to Patient/Family/Significant other/Guardian/POA/by[de-identified] to patient Rashmi Jo by Ford AWAD, RN, CM  IMM Letter date given[de-identified] 04/21/23  IMM Letter time given[de-identified] 26    Transportation:  Transportation PLAN for discharge: family   Mode of Transport: Private Car  Reason for medical transport: Not Applicable  Name of [address] [place] [address], [address]: Not Applicable  Time of Transport:     Transport form

## 2023-04-21 NOTE — PLAN OF CARE
Problem: Discharge Planning  Goal: Discharge to home or other facility with appropriate resources  4/19/2023 0233 by Sav Sullivan RN  Outcome: Progressing  Flowsheets (Taken 4/19/2023 0233)  Discharge to home or other facility with appropriate resources:   Identify barriers to discharge with patient and caregiver   Arrange for needed discharge resources and transportation as appropriate  Note: Pt will return home at discharge. Problem: Pain  Goal: Verbalizes/displays adequate comfort level or baseline comfort level  4/19/2023 0233 by Sav Sullivan RN  Outcome: Progressing  Flowsheets (Taken 4/19/2023 0233)  Verbalizes/displays adequate comfort level or baseline comfort level:   Encourage patient to monitor pain and request assistance   Assess pain using appropriate pain scale   Administer analgesics based on type and severity of pain and evaluate response   Implement non-pharmacological measures as appropriate and evaluate response  Note: Pt utilizing PCA pump for management, pt resting comfortably throughout the night. Problem: Safety - Adult  Goal: Free from fall injury  4/19/2023 0233 by Sav Sullivan RN  Outcome: Progressing  Flowsheets (Taken 4/19/2023 0233)  Free From Fall Injury:   Instruct family/caregiver on patient safety   Based on caregiver fall risk screen, instruct family/caregiver to ask for assistance with transferring infant if caregiver noted to have fall risk factors  Note: All fall precautions in place. Problem: ABCDS Injury Assessment  Goal: Absence of physical injury  Outcome: Progressing  Absence of Physical Injury: Implement safety measures based on patient assessment  Note: Pt high fall risk, all safety measures in place.
Problem: Discharge Planning  Goal: Discharge to home or other facility with appropriate resources  4/19/2023 1159 by Gunjan Gray RN  Outcome: Progressing     Problem: Pain  Goal: Verbalizes/displays adequate comfort level or baseline comfort level  4/19/2023 1159 by Gunjan Gray RN  Outcome: Progressing  Monitoring pain trend, implementing non-pharm interventions and also admins PRN pain medication. Will continue to monitor and maintain Pts goal for pain relief. Problem: Safety - Adult  Goal: Free from fall injury  4/19/2023 1159 by Gunjan Gray RN  Outcome: Progressing  Flowsheets (Taken 4/19/2023 0824)  Free From Fall Injury:   Matilde Parry family/caregiver on patient safety   Based on caregiver fall risk screen, instruct family/caregiver to ask for assistance with transferring infant if caregiver noted to have fall risk factors     Problem: Skin/Tissue Integrity  Goal: Absence of new skin breakdown  Description: 1. Monitor for areas of redness and/or skin breakdown  2. Assess vascular access sites hourly  3. Every 4-6 hours minimum:  Change oxygen saturation probe site  4. Every 4-6 hours:  If on nasal continuous positive airway pressure, respiratory therapy assess nares and determine need for appliance change or resting period.   Outcome: Progressing
Problem: Discharge Planning  Goal: Discharge to home or other facility with appropriate resources  4/21/2023 1425 by Aicha Watkins RN  Outcome: Adequate for Discharge  4/21/2023 0324 by Arturo Coreas RN  Outcome: Progressing  Flowsheets (Taken 4/21/2023 0324)  Discharge to home or other facility with appropriate resources:   Identify barriers to discharge with patient and caregiver   Arrange for needed discharge resources and transportation as appropriate  Note: Pt will return home with spouse at discharge. Problem: Pain  Goal: Verbalizes/displays adequate comfort level or baseline comfort level  4/21/2023 1425 by Aicha Watkins RN  Outcome: Adequate for Discharge  4/21/2023 0324 by Arturo Coreas RN  Outcome: Progressing  Flowsheets (Taken 4/21/2023 0324)  Verbalizes/displays adequate comfort level or baseline comfort level:   Encourage patient to monitor pain and request assistance   Assess pain using appropriate pain scale  Note: Scheduled pain medication and PRN's given for management. Pt satisfied with interventions. Problem: Safety - Adult  Goal: Free from fall injury  4/21/2023 1425 by Aicha Watkins RN  Outcome: Adequate for Discharge  Flowsheets (Taken 4/21/2023 8038)  Free From Fall Injury: Instruct family/caregiver on patient safety  4/21/2023 0324 by Arturo Coreas RN  Outcome: Progressing  Flowsheets (Taken 4/21/2023 0324)  Free From Fall Injury:   Erminio Lorne family/caregiver on patient safety   Based on caregiver fall risk screen, instruct family/caregiver to ask for assistance with transferring infant if caregiver noted to have fall risk factors  Note: Pt at risk for falls, all precautions in place.        Problem: ABCDS Injury Assessment  Goal: Absence of physical injury  4/21/2023 1425 by Aicha Watkins RN  Outcome: Adequate for Discharge  4/21/2023 0324 by Arturo Coreas RN  Outcome: Progressing     Problem: Skin/Tissue Integrity  Goal: Absence of new skin breakdown  Description:
Problem: Discharge Planning  Goal: Discharge to home or other facility with appropriate resources  Outcome: Progressing     Problem: Pain  Goal: Verbalizes/displays adequate comfort level or baseline comfort level  Outcome: Progressing     Problem: Safety - Adult  Goal: Free from fall injury  Outcome: Progressing
Problem: Discharge Planning  Goal: Discharge to home or other facility with appropriate resources  Outcome: Progressing  Flowsheets (Taken 4/20/2023 0324)  Discharge to home or other facility with appropriate resources:   Identify barriers to discharge with patient and caregiver   Arrange for needed discharge resources and transportation as appropriate  Note: Pt will return home at discharge. Problem: Pain  Goal: Verbalizes/displays adequate comfort level or baseline comfort level  Outcome: Progressing  Flowsheets (Taken 4/20/2023 0324)  Verbalizes/displays adequate comfort level or baseline comfort level:   Encourage patient to monitor pain and request assistance   Assess pain using appropriate pain scale   Administer analgesics based on type and severity of pain and evaluate response  Note: PRN pain medication given for management. Problem: Safety - Adult  Goal: Free from fall injury  Outcome: Progressing  Free From Fall Injury:   Instruct family/caregiver on patient safety   Based on caregiver fall risk screen, instruct family/caregiver to ask for assistance with transferring infant if caregiver noted to have fall risk factors  Note: Pt high fall risk, all precautions in place. Problem: ABCDS Injury Assessment  Goal: Absence of physical injury  Outcome: Progressing  Absence of Physical Injury: Implement safety measures based on patient assessment  Note: Safety measures in place, pt educated on call light use.
Problem: Pain  Goal: Verbalizes/displays adequate comfort level or baseline comfort level  4/21/2023 0324 by Rima Matthews RN  Outcome: Progressing  Flowsheets (Taken 4/21/2023 0324)  Verbalizes/displays adequate comfort level or baseline comfort level:   Encourage patient to monitor pain and request assistance   Assess pain using appropriate pain scale  Note: Scheduled pain medication and PRN's given for management. Pt satisfied with interventions. Problem: Safety - Adult  Goal: Free from fall injury  4/21/2023 0324 by Rima Matthews RN  Outcome: Progressing  Flowsheets (Taken 4/21/2023 0324)  Free From Fall Injury:   Instruct family/caregiver on patient safety   Based on caregiver fall risk screen, instruct family/caregiver to ask for assistance with transferring infant if caregiver noted to have fall risk factors  Note: Pt at risk for falls, all precautions in place. Problem: Discharge Planning  Goal: Discharge to home or other facility with appropriate resources  4/21/2023 0324 by Rima Matthews RN  Outcome: Progressing  Flowsheets (Taken 4/21/2023 0324)  Discharge to home or other facility with appropriate resources:   Identify barriers to discharge with patient and caregiver   Arrange for needed discharge resources and transportation as appropriate  Note: Pt will return home with spouse at discharge.
RN  Outcome: Progressing  Flowsheets (Taken 4/18/2023 1625 by Tadeo Reynolds RN)  Absence of Physical Injury: Implement safety measures based on patient assessment  Note: Safety measures in place, pt educated on call light use. Problem: Skin/Tissue Integrity  Goal: Absence of new skin breakdown  Description: 1. Monitor for areas of redness and/or skin breakdown  2. Assess vascular access sites hourly  3. Every 4-6 hours minimum:  Change oxygen saturation probe site  4. Every 4-6 hours:  If on nasal continuous positive airway pressure, respiratory therapy assess nares and determine need for appliance change or resting period.   Outcome: Progressing

## 2023-04-24 NOTE — DISCHARGE SUMMARY
Physician Discharge Summary     Patient ID:  Tete Woodard  [id number]  69 y.o.  1945    Admit date: 4/18/2023    Discharge date and time: 4/21/2023    Admitting Physician: Terese Mena MD     Discharge Physician: Terese Mena MD     Admission Diagnoses: Nodule of lower lobe of right lung [R91.1]  Right lower lobe lung mass [R91.8]    Discharge Diagnoses: same as above     Admission Condition: good    Discharged Condition: good    Indication for Admission: Surgery:  Right video assisted thoracoscopic surgery; mini thoracotomy via 4th and 6th ICS with resection of RLL; lysis of extensive pulmonary adhesions; ICNB x 5 levels (4 through 8)    Hospital Course: 69 y.o. 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 The patient underwent the above procedure with Dr. Marixa Ravi. Surgery was uneventful and the patient was admitted to post-operative surgical floor in stable condition for post-operative care including IV hydration, monitoring and pain and nausea management, and monitoring of chest tube. Chest tube was removed POD#2. Post-op pain was tolerable on multimodal pain regimen, was taking adequate po, voiding freely. The patient was was discharged in stable condition. Surgical pathology was obtained during procedure and pending at time of discharge. Treatments: chest tube     Disposition:  home    Patient Instructions:    Activity: activity as tolerated and no driving while on analgesics  Diet: Regular  Wound Care: as directed  Follow-up with Dr. Marixa Ravi in 1-2 weeks for post-operative follow up and wound check     Signed:  Nance Soulier, APRN - CNP

## 2023-04-25 ENCOUNTER — TELEPHONE (OUTPATIENT)
Dept: CARDIOTHORACIC SURGERY | Age: 78
End: 2023-04-25

## 2023-04-25 NOTE — TELEPHONE ENCOUNTER
Received a call from patient's wife in regards to patient's CT site. She states that yesterday when she changed his bandage it was seeping blood. She cleaned the site and placed a non stick bandage which controlled the seepage. Bandage is not saturated. Instructed for patient to continue to clean incision daily with antibacterial soap and water and to place bandage if drainage continues. I did offer patient an appointment tomorrow with Dr. Criselda Velasco but they feel that at this moment it is not needed. They were instructed to contacted the office with any issues including bleeding that they can't control. They are aware and agreeable with plan.

## 2023-04-26 NOTE — PROGRESS NOTES
04/19/23 1426   Encounter Summary   Encounter Overview/Reason  Crisis   Service Provided For: Family; Patient not available   Referral/Consult From: Multi-disciplinary team   Support System Spouse; Children   Last Encounter  04/19/23  (Spt for spouse during Rapid Response. [facility])   Complexity of Encounter Moderate   Begin Time 1408   End Time  1427   Total Time Calculated 19 min   Crisis   Type Rapid Response   Assessment/Intervention/Outcome   Assessment Calm;Coping   Intervention Active listening;Discussed belief system/Religion practices/iliana;Discussed illness injury and its impact; Explored/Affirmed feelings, thoughts, concerns;Sustaining Presence/Ministry of presence   Outcome Comfort; Connection/Belonging;Coping;Engaged in conversation;Expressed feelings, needs, and concerns;Expressed Gratitude;Receptive   Plan and Referrals   Plan/Referrals Continue Support (comment)  (As needed.)      provided supportive presence and listening for patient's wife during Rapid Response.
4 Eyes Admission Assessment     I agree as the admission nurse that 2 RN's have performed a thorough Head to Toe Skin Assessment on the patient. ALL assessment sites listed below have been assessed on admission. Areas assessed by both nurses:   [x]   Head, Face, and Ears   [x]   Shoulders, Back, and Chest: Right chest incision under armpit. Right chest tube  [x]   Arms, Elbows, and Hands   [x]   Coccyx, Sacrum, and Ischum  [x]   Legs, Feet, and Heels        Does the Patient have Skin Breakdown?   No         Brad Prevention initiated:  Yes   Wound Care Orders initiated:  No      WOC nurse consulted for Pressure Injury (Stage 3,4, Unstageable, DTI, NWPT, and Complex wounds):  No      Nurse 1 eSignature: Electronically signed by Brenton Siegel RN on 23 at 7:10 PM EDT    **SHARE this note so that the co-signing nurse is able to place an eSignature**    Nurse 2 eSignature: {Esignature:[id number]}
Cardiothoracic Surgery   Daily Progress Note  Patient: Bartolo Ruiz      CC: RLL lobectomy     SUBJECTIVE:   Patient rested well overnight. Had an episode of bleeding through dressing at chest tube site last night that was resolved after 20 minutes of constant pressure. He states that he feels much better this morning. The pain is tolerable. Remained afebrile and HDS. Denies nausea or vomiting. Tolerating diet. He has not had a bowel movement. He has been OOB ambulating  . ROS:   A 14 point review of systems was conducted, significant findings as noted above. All other systems negative. OBJECTIVE:    PHYSICAL EXAM:    Vitals:    04/20/23 2200 04/21/23 0016 04/21/23 0200 04/21/23 0405   BP:  114/61  110/66   Pulse: 67 66 68 57   Resp:  18  16   Temp:  98.8 °F (37.1 °C)  98.5 °F (36.9 °C)   TempSrc:  Oral  Oral   SpO2:  92%  92%   Weight:       Height:           General appearance: alert, no acute distress  Eyes: No scleral icterus, EOM grossly intact  Neck: trachea midline, no JVD, neck supple  Chest/Lungs: Normal effort with no accessory muscle use, no signs of respiratory distress. Chest appropriately tender to palpation around chest tube site. Occlusive dressing on R side of chest with no bleeding. Cardiovascular: RRR   Abdomen: Soft, non-tender, non-distended, no rebound, guarding, or rigidity. Skin: warm and dry, no rashes  Extremities: no edema, no cyanosis  Genitourinary: Voiding freely and appropriately. Neuro: A&Ox3, no focal deficits, sensation intact    LABS:   Recent Labs     04/19/23  1525 04/21/23  0325   WBC 10.3 8.3   HGB 12.0* 10.8*   HCT 35.2* 31.7*   MCV 97.8 96.7    203          Recent Labs     04/21/23  0325      K 4.5      CO2 31   PHOS 2.8   BUN 21*   CREATININE 1.1      No results for input(s): AST, ALT, ALB, BILIDIR, BILITOT, ALKPHOS in the last 72 hours. No results for input(s): LIPASE, AMYLASE in the last 72 hours.    No results for input(s): PROT, INR, APTT
Chest tube secured with bands.
Chest xray done.
Coreg was held this evening d/t HR 57.
Department of Surgery:  Post-op Note    CC: Chest pain    Subjective:   Pain is controlled with current medications. Denies nausea or vomiting. Patient feeling \"groggy\". Denies difficulty breathing at this time. Objective:  Anesthesia type: General      I/O    Intra op    Post op     Fluids  500 mL 450 mL     EBL 25 mL 0 mL     Urine 700 mL 850 mL     Physical Exam:  Vitals:    04/18/23 1747 04/18/23 1816 04/18/23 1846 04/18/23 1933   BP: (!) 180/82 (!) 183/88 (!) 171/76 (!) 176/87   Pulse: 68 65 63 73   Resp:  13 14 17   Temp: 97.5 °F (36.4 °C) 97.4 °F (36.3 °C) (!) 96.5 °F (35.8 °C) 98 °F (36.7 °C)   TempSrc: Axillary Axillary Axillary Oral   SpO2: 98% 97%  95%   Weight:       Height:           General appearance: alert, no acute distress, grooming appropriate  Chest/Lungs: no adventitious breath sounds, normal effort on [facility]  Cardiovascular: RRR. Chest tube in place, tidling, bubbles, moderate strike through of dressing.   Abdomen: soft, non tender, no guarding or rigidity  : sims in place with clear yellow urine  Extremities: no edema, no cyanosis  Neuro: A&Ox3, no focal deficits, sensation intact    Assessment and Plan  This is a 69y.o. year old male with a diagnosis of enlarging PET + GGO in RLL s/p mini thoracotomy via 4th and 6th ICS with resection of RLL, Lysis of extensive pulmonary adhesions, ICNB x 5 levels  POD #0    Pain management: dilaudid PCA, scheduled tylenol, PRN dilaudid and oxycodone  Cardiovascular: amlodipine, lipitor, coreg  Respiratory: extubated, encourage hourly incentive spirometry and deep breathing  FEN:  Fluids: LR 75, Diet: regular  : Urine output is adequate  Wound: local care; chest tube to suction   Ambulation: OOB to chair, encourage ambulation  Prophylaxis: SCDs, juliette Goldsmith DO  PGY1, General Surgery  04/18/23  8:03 PM  [facility]  Pager: [phone]
Ice chips tolerated
PACU Transfer Note to PCU    Vitals:    04/18/23 1545   BP: (!) 168/71   Pulse: 58   Resp: 16   Temp: 97.8 °F (36.6 °C)   SpO2: 97%       In: 550 [I.V.:500]  Out: 1085 [Urine:1000]    Pain assessment:  within guidelines. Pain Level: 3    Report given to Receiving unit RN.    4/18/2023 3:51 PM     Patient due for oral medications when tolerated.
PCA discontinued. Dilaudid 5.26ml residual.  Wasted 5.26ml dilaudid with Charge RN, Elizabeth. Pink form filled out and sent back to pharmacy.
POC  Notified by nursing staff patient had bled through dressing at chest tube site. Patient was evaluated and found to have oozing at chest tube site. Pressure was held for 20 minutes given this is second time bleeding occurred. There was no hematoma present and patient remains on RA with no chest pain or shortness of breathe. Will continue to monitor.      Paris Ruby DO PGY-3  General Surgery  04/20/23  10:02 PM
Patient set to d/c at this time. PIV removed, off tele returned to . All d/c questions answered at this time.  Waiting for  now
Patient's belongings ( eye glasses, CPAP, clothing) at bedside.
Physician Progress Note      Gibran Jaime  CSN #:                  [id number]  :                       1945  ADMIT DATE:       2023 10:31 AM  DISCH DATE:        2023 2:50 PM  RESPONDING  PROVIDER #:        Juno WANG          QUERY TEXT:    Pt admitted -  with RLL nodules, s/p Wedge resection and noted to have   surgical pathology consistent with 2 lesions with separate morpholigies:   carcinoid tumor & primary adenocarcinoma. 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 Synchronous   tumors present, morphologically distinct, unrelated lesions  with 2 tumors   identified- 1 focus of lung primary adenocarcinoma, 1 focus of neuroendocrine   tumor, well-differentiated/carcinoid tumor. Per d/c sum \"right lower lobe   nodule suspicious for neoplasm\". 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 Query created by:  Khris Macias on 2023 11:51 AM      Electronically signed by:  Brett Mcbride 2023 12:08 PM
Point of Care Note  General Surgery        Time: 1000  Date: 4/20/23    Chest tube pulled without issues. Occlusive dressing applied. Will assess at 1400.     Makenzie Horan DO  PGY1, General Surgery  04/20/23  10:50 AM  [facility]  Pager: [phone]
Pt alert and oriented. IV started. T&S drawn and sent to lab . LR infusing. Ancef to OR. Dr. Berkowitz Look aware that IV is a 20 gauge.
Report received from OR nurse and anesthesia staff; post R VATS; mini thoracotomy via 4th and 6th ICS with resection of RLL; lysis of extensive pulmonary adhesions; ICNB x 5 levels (4 through 8) - Right; surgery was uneventful; no subcutaneous emphysema; chest tube on right chest is dry and intact; patient is restless; received with 60cc from chest tube; small air leak from chest tube; Exparel given in OR.
Surgery team responded to rapid response. Nursing holding pressure at chest tube skin site and stated patient tried to get up without assistance and presumably tugged on tube. Moderate amount of bleeding on right side and saturated through chest tube dressing. Chest tube not draining any fluid or blood. Patient not short of breath and no increased oxygen demand. Dressing removed and significant for mild superficial skin bleeding around  chest tube. Skin suture still in place and around chest tube. Pressure held for 10 minutes with hemostasis achieved. Clean dressing applied. CXR obtained with no appreciable pneumothorax and chest tube remains in place. Will repeat CBC to assess amount of blood loss.
Updated pt's wife on his current condition. All questions and concerns answered.  Electronically signed by Gloria Ramos RN on 4/18/23 at 11:00 PM.
mini thoracotomy via 4th and 6th ICS with resection of RLL, Lysis of extensive pulmonary adhesions, ICNB x 5 levels (4/18)    - continue chest tube to wall suction, will water seal this PM  - MMPC   - attempt to wean dilaudid PCA, transition to push/ oral   - continue diet  - encourage OOB, ambulation, IS, deep breathing  - SLIV when tolerating appropriate PO    Farhana Lee,   PGY1, General Surgery  04/19/23  6:41 AM  [facility]  Pager: [phone]    I am post-call today and will not be on campus. Please contact Dr. Christie Johns at [phone] for questions or concerns regarding this patient.
of enlarging PET + GGO in RLL s/p mini thoracotomy via 4th and 6th ICS with resection of RLL, Lysis of extensive pulmonary adhesions, ICNB x 5 levels (4/18)    - water seal, 391 ml output yesterday  - MMPC  - PO pain meds  - continue diet  - encourage OOB, ambulation, IS, deep breathing  - PRASHANTH St, CNP  4/20/2023  6:53 AM  [facility]

## 2023-05-02 NOTE — OP NOTE
Similarly the  fissure between the middle lobe and the lower lobe anteriorly needed to  be completed with staples. The hilar dissection was straightforward and  was done with patience and use of gentle electrocautery. Once the entirety of the hilum of the right lower lobe was isolated, the  hilum was then transected with an endoscopic JEROD stapler that was  brought on through a separate small aperture two interspaces below the  sixth intercostal space. This worked well and the specimen was able to  be removed. We then looked in the region of the inferior pulmonary  ligament and no lymph nodes were seen in that. I presumed they went  with the specimen. From the 4R approach, we were able to get a small  window into the paratracheal region. The adhesions in this area were  somewhat dense and prominent. I am presuming from his prior bypass  surgery. We dissected as much we could in and around that area all we  ran into was paratracheal fat and no obvious lymph nodes. A single 28-[place]ish chest tube was then advanced in through the port  sites and then used for the stapler. The two intercostal openings were  then closed with a single figure-of-eight #2 Vicryl suture. The  serratus muscle, subcutaneous tissues and skin were then closed in  routine fashion with running absorbable suture. A Dermabond dressing  was placed. Before we started the operative procedure, a five level intercostal  nerve block was placed using a 50:50 mixture of Exparel and 0.5% plain  Marcaine. Thereafter, the patient was awakened, extubated and prepared for  transfer to the post anesthesia care unit in stable condition for  ongoing care.         Sumi Carranza MD    D: 05/01/2023 14:57:07       T: 05/01/2023 15:03:09     [id number]  Job#: [id number]     Doc#: [id number]      CC:

## 2023-05-03 ENCOUNTER — OFFICE VISIT (OUTPATIENT)
Dept: CARDIOTHORACIC SURGERY | Age: 78
End: 2023-05-03

## 2023-05-03 VITALS
SYSTOLIC BLOOD PRESSURE: 128 MMHG | TEMPERATURE: 98 F | BODY MASS INDEX: 26.01 KG/M2 | HEART RATE: 55 BPM | HEIGHT: 72 IN | DIASTOLIC BLOOD PRESSURE: 62 MMHG | WEIGHT: 192 LBS | OXYGEN SATURATION: 96 %

## 2023-05-03 DIAGNOSIS — Z09 FOLLOW-UP SURGERY CARE: Primary | ICD-10-CM

## 2023-05-03 PROBLEM — G89.18 POST-OP PAIN: Status: ACTIVE | Noted: 2023-05-03

## 2023-05-03 PROCEDURE — 99024 POSTOP FOLLOW-UP VISIT: CPT | Performed by: THORACIC SURGERY (CARDIOTHORACIC VASCULAR SURGERY)

## 2023-05-03 NOTE — PROGRESS NOTES
Katiuska Torres returns the office today for his postoperative checkup after his right lower lobectomy performed on April 19. I personally handed him a copy of his pathology report. 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 The non-small cell cancer will need additional review. My office will schedule appointment for Katiuska Torres with Dr. Evert Sharma so that they can sort out what may or may not be necessary for adjuvant treatment. On exam Chele's operative incision is healed completely already. The chest tube site occasionally bleeds when he rolls over on it at night while sleeping. Of asked him to continue to place a Band-Aid over the site until this is stopped happening. All of his and his wife's questions were answered. At this point I am going to leave further follow-up my office open-ended. Katiuska Torres and his wife know they are welcome to call or return at anytime in the future should any new problems, questions or concerns arise.

## 2023-06-06 ENCOUNTER — HOSPITAL ENCOUNTER (OUTPATIENT)
Dept: INTERVENTIONAL RADIOLOGY/VASCULAR | Age: 78
Discharge: HOME | End: 2023-06-06
Payer: MEDICARE

## 2023-06-06 VITALS — HEIGHT: 72 IN | TEMPERATURE: 98.3 F | WEIGHT: 190 LBS | BODY MASS INDEX: 25.73 KG/M2

## 2023-06-06 DIAGNOSIS — C34.31 BRONCHOGENIC CARCINOMA OF LOWER LOBE OF RIGHT LUNG (HCC): ICD-10-CM

## 2023-06-06 LAB
DEPRECATED RDW RBC AUTO: 14.7 % (ref 12.4–15.4)
HCT VFR BLD AUTO: 33.7 % (ref 40.5–52.5)
HGB BLD-MCNC: 11.4 G/DL (ref 13.5–17.5)
INR BLD: 1.1 (ref 0.84–1.16)
MCH RBC QN AUTO: 32.3 PG (ref 26–34)
MCHC RBC AUTO-ENTMCNC: 33.9 G/DL (ref 31–36)
MCV RBC AUTO: 95.3 FL (ref 80–100)
PLATELET # BLD AUTO: 281 K/UL (ref 135–450)
PMV BLD AUTO: 7.5 FL (ref 5–10.5)
RBC # BLD AUTO: 3.53 M/UL (ref 4.2–5.9)
WBC # BLD AUTO: 5.5 K/UL (ref 4–11)

## 2023-06-06 PROCEDURE — 36590 REMOVAL TUNNELED CV CATH: CPT

## 2023-06-06 PROCEDURE — 85610 PROTHROMBIN TIME: CPT

## 2023-06-06 PROCEDURE — 99152 MOD SED SAME PHYS/QHP 5/>YRS: CPT

## 2023-06-06 PROCEDURE — 2500000003 HC RX 250 WO HCPCS

## 2023-06-06 PROCEDURE — 77001 FLUOROGUIDE FOR VEIN DEVICE: CPT

## 2023-06-06 PROCEDURE — 6360000002 HC RX W HCPCS

## 2023-06-06 PROCEDURE — 85027 COMPLETE CBC AUTOMATED: CPT

## 2023-06-06 NOTE — PROCEDURES
Interventional Radiology Post Procedure    Date: 6/6/2023    Physician: Heidy Lockett MD    Pre-op Diagnosis: lung cancer    Post-op Diagnosis: same    Variation from Planned Procedure: None       Findings: successful right chest port removal    Patient condition: Stable    Estimated Blood Loss: 15 cc    Specimens:  none sent      Signed,  Mayi Thomas MD  1:31 PM  6/6/2023

## 2023-06-06 NOTE — DISCHARGE INSTRUCTIONS
apply warm wet compresses on the site until the soreness is relieved and elevate the arm above the heart. Call your physician if no improvement in 2 to 3 days    DIETARY INSTRUCTIONS:    ____ Drink extra fluids over the next 24 hours (If not contraindicated by illness or by physician order)  ____ Start with clear liquids and progress to normal diet as you feel like eating. If you experience nausea or repeated episodes of vomiting, which persist beyond 12-24 hours, notify your doctor        __X__ Resume your previous diet    ACTIVITY INSTRUCTIONS:    __X__ See other instructions  ____ No special instructions  ____ Rest for 24 hours    ____ Up as tolerated  ____ Increase activity as tolerated    Wound/Dressing Instructions:  __X__ See other instructions  ____ May shower, tomorrow  ____ Remove bandage within 24 hours    MEDICATION INSTRUCTIONS:    ____ See Medication Reconciliation Sheet      SPECIAL INSTRUCTIONS:  __________________________________________________________________________________________________________________________________________________________________________________________________________________________________________                                                                                                                                                                                                                                                                                                  Please make sure that you follow-up with your doctors office for your results. FOLLOW-UP APPOINTMENT    Follow up with MD as directed. Belongings returned to patient and/or family: YES. The Discharge Instructions have been explained to me. I understand and can verbalize these instructions.

## (undated) DEVICE — ADHESIVE SKIN CLSR 0.7ML TOP DERMBND ADV

## (undated) DEVICE — ANTI-FOG SOLUTION WITH FOAM PAD: Brand: DEVON

## (undated) DEVICE — NEEDLE 21GAX0.75X12IN COLLECT BLD SFTY

## (undated) DEVICE — SPONGE GZ W4XL4IN COT 12 PLY TYP VII WVN C FLD DSGN STERILE

## (undated) DEVICE — GLOVE SURG SZ 75 L12IN FNGR THK94MIL TRNSLUC YEL LTX

## (undated) DEVICE — GENERAL: Brand: MEDLINE INDUSTRIES, INC.

## (undated) DEVICE — DISSECTOR LAP DIA5MM BLNT TIP ENDOPATH

## (undated) DEVICE — SPONGE,PEANUT,XRAY,ST,SM,3/8",5/CARD: Brand: MEDLINE INDUSTRIES, INC.

## (undated) DEVICE — SHEET, T, LAPAROTOMY, STERILE: Brand: MEDLINE

## (undated) DEVICE — ROYALSILK SURGICAL GOWN, L: Brand: CONVERTORS

## (undated) DEVICE — PROTECTOR ULN NRV PUR FOAM HK LOOP STRP ANATOMICALLY

## (undated) DEVICE — SUTURE PDS II SZ 0 L27IN ABSRB VLT L36MM CT-1 1/2 CIR Z340H

## (undated) DEVICE — COVER LT HNDL CAM BLU DISP W/ SURG CTRL

## (undated) DEVICE — STANDARD HYPODERMIC NEEDLE,POLYPROPYLENE HUB: Brand: MONOJECT

## (undated) DEVICE — DRAIN SURG SGL COLL PT TB FOR ATS BG OASIS

## (undated) DEVICE — TROCAR: Brand: KII FIOS FIRST ENTRY

## (undated) DEVICE — TOTAL TRAY, 16FR 10ML SIL FOLEY, URN: Brand: MEDLINE

## (undated) DEVICE — 3M™ TEGADERM™ TRANSPARENT FILM DRESSING FRAME STYLE, 1628, 6 IN X 8 IN (15 CM X 20 CM), 10/CT 8CT/CASE: Brand: 3M™ TEGADERM™

## (undated) DEVICE — SUTURE VCRL SZ 3-0 L27IN ABSRB UD L26MM SH 1/2 CIR J416H

## (undated) DEVICE — KIT,ANTI FOG,W/SPONGE & FLUID,SOFT PACK: Brand: MEDLINE

## (undated) DEVICE — BLANKET WRM W40.2XL55.9IN IORT LO BODY + MISTRAL AIR

## (undated) DEVICE — APPLICATOR PREP 26ML 0.7% IOD POVACRYLEX 74% ISO ALC ST

## (undated) DEVICE — CONNECTOR TBNG WHT PLAS SUCT STR 5IN1 LTWT W/ M CONN

## (undated) DEVICE — 3M™ IOBAN™ 2 ANTIMICROBIAL INCISE DRAPE 6648EZ: Brand: IOBAN™ 2

## (undated) DEVICE — SYRINGE MED 30ML STD CLR PLAS LUERLOCK TIP N CTRL DISP

## (undated) DEVICE — SUTURE MCRYL SZ 4-0 L27IN ABSRB UD L19MM PS-2 1/2 CIR PRIM Y426H

## (undated) DEVICE — 3M™ STERI-DRAPE™ INSTRUMENT POUCH 1018: Brand: STERI-DRAPE™

## (undated) DEVICE — GLIDESCOPE BFLEX 3.8 BRONCHOSCOPE

## (undated) DEVICE — E-Z CLEAN, NON-STICK, PTFE COATED, ELECTROSURGICAL BLADE ELECTRODE, MODIFIED EXTENDED INSULATION, 6.5 INCH (16.5 CM): Brand: MEGADYNE

## (undated) DEVICE — TROCAR ENDOSCP L80MM DIA10/12MM THOR RIG SL DISP FLXPATH

## (undated) DEVICE — STAPLER INT L340MM 45MM STD 12 FIRING B FRM PWR + GRIPPING

## (undated) DEVICE — SOLUTION IV 1000ML 0.9% SOD CHL

## (undated) DEVICE — SUTURE PERMAHAND SZ 0 L30IN NONABSORBABLE BLK SILK BRAID A306H

## (undated) DEVICE — SUTURE ABSORBABLE BRAIDED 2-0 CT-1 27 IN UD VICRYL J259H

## (undated) DEVICE — E-Z CLEAN, NON-STICK, PTFE COATED, ELECTROSURGICAL BLADE ELECTRODE, 2.5 INCH (6.35 CM): Brand: EZ CLEAN

## (undated) DEVICE — SOLUTION ANTIFOG VIS SYS CLEARIFY LAPSCP

## (undated) DEVICE — TOWEL,OR,DSP,ST,WHITE,DLX,4/PK,20PK/CS: Brand: MEDLINE

## (undated) DEVICE — GOWN,SIRUS,POLYRNF,BRTHSLV,LG,30/CS: Brand: MEDLINE

## (undated) DEVICE — SUTURE PERMAHAND SZ 2-0 L30IN NONABSORBABLE BLK SILK W/O A305H

## (undated) DEVICE — CATHETER THORACENTESIS STR 28 FRX23 IN 6 EYELET TAPR TIP LF - SEE COMMENT

## (undated) DEVICE — SUTURE VCRL SZ 2 L27IN ABSRB VLT L65MM TP-1 1/2 CIR J649G

## (undated) DEVICE — SPONGE,DRAIN,NONWVN,4"X4",6PLY,STRL,LF: Brand: MEDLINE

## (undated) DEVICE — RELOAD STPL L45MM H2-4.1MM THCK TISS GRN GRIPPING SURF B

## (undated) DEVICE — TUBING, SUCTION, 1/4" X 12', STRAIGHT: Brand: MEDLINE